# Patient Record
Sex: FEMALE | Race: WHITE | Employment: PART TIME | URBAN - METROPOLITAN AREA
[De-identification: names, ages, dates, MRNs, and addresses within clinical notes are randomized per-mention and may not be internally consistent; named-entity substitution may affect disease eponyms.]

---

## 2018-02-09 ENCOUNTER — HOSPITAL ENCOUNTER (OUTPATIENT)
Dept: ULTRASOUND IMAGING | Age: 28
Discharge: HOME | End: 2018-02-11
Payer: MEDICAID

## 2018-02-09 DIAGNOSIS — Z34.81 ENCOUNTER FOR SUPERVISION OF OTHER NORMAL PREGNANCY IN FIRST TRIMESTER: ICD-10-CM

## 2018-02-09 PROCEDURE — 76801 OB US < 14 WKS SINGLE FETUS: CPT

## 2023-12-18 ENCOUNTER — ANCILLARY PROCEDURE (OUTPATIENT)
Dept: RADIOLOGY | Facility: CLINIC | Age: 33
End: 2023-12-18
Payer: COMMERCIAL

## 2023-12-18 ENCOUNTER — OFFICE VISIT (OUTPATIENT)
Dept: ORTHOPEDIC SURGERY | Facility: CLINIC | Age: 33
End: 2023-12-18
Payer: COMMERCIAL

## 2023-12-18 DIAGNOSIS — M79.644 PAIN OF FINGER OF RIGHT HAND: ICD-10-CM

## 2023-12-18 PROCEDURE — 99214 OFFICE O/P EST MOD 30 MIN: CPT | Performed by: STUDENT IN AN ORGANIZED HEALTH CARE EDUCATION/TRAINING PROGRAM

## 2023-12-18 PROCEDURE — 73140 X-RAY EXAM OF FINGER(S): CPT | Mod: RT,FY

## 2023-12-18 PROCEDURE — 2500000004 HC RX 250 GENERAL PHARMACY W/ HCPCS (ALT 636 FOR OP/ED): Performed by: STUDENT IN AN ORGANIZED HEALTH CARE EDUCATION/TRAINING PROGRAM

## 2023-12-18 PROCEDURE — 73140 X-RAY EXAM OF FINGER(S): CPT | Mod: RIGHT SIDE | Performed by: STUDENT IN AN ORGANIZED HEALTH CARE EDUCATION/TRAINING PROGRAM

## 2023-12-18 PROCEDURE — 20550 NJX 1 TENDON SHEATH/LIGAMENT: CPT | Mod: RT | Performed by: STUDENT IN AN ORGANIZED HEALTH CARE EDUCATION/TRAINING PROGRAM

## 2023-12-18 PROCEDURE — 99204 OFFICE O/P NEW MOD 45 MIN: CPT | Performed by: STUDENT IN AN ORGANIZED HEALTH CARE EDUCATION/TRAINING PROGRAM

## 2023-12-18 PROCEDURE — 2500000005 HC RX 250 GENERAL PHARMACY W/O HCPCS: Performed by: STUDENT IN AN ORGANIZED HEALTH CARE EDUCATION/TRAINING PROGRAM

## 2023-12-18 RX ORDER — MELOXICAM 15 MG/1
15 TABLET ORAL DAILY
Qty: 30 TABLET | Refills: 0 | Status: SHIPPED | OUTPATIENT
Start: 2023-12-18 | End: 2024-01-17

## 2023-12-18 RX ORDER — LIDOCAINE HYDROCHLORIDE 10 MG/ML
0.5 INJECTION INFILTRATION; PERINEURAL
Status: COMPLETED | OUTPATIENT
Start: 2023-12-18 | End: 2023-12-18

## 2023-12-18 RX ADMIN — TRIAMCINOLONE ACETONIDE 5 MG: 10 INJECTION, SUSPENSION INTRA-ARTICULAR; INTRALESIONAL at 09:36

## 2023-12-18 RX ADMIN — LIDOCAINE HYDROCHLORIDE 0.5 ML: 10 INJECTION, SOLUTION INFILTRATION; PERINEURAL at 09:36

## 2023-12-18 NOTE — PROGRESS NOTES
History of Present Illness:  Presents for evaluation of right index finger pain.  The symptoms have been present for months. The patient denies any inciting trauma. The pain is localized to the A1 pulley of the affected finger. It is described as moderate. The pain occurs intermittantly and is more severe overnight and in the morning.  She works with her hands and bioengineering.      Review of Systems   GENERAL: Negative for malaise, significant weight loss, fever  MUSCULOSKELETAL: see HPI  NEURO:  Negative    The patient's past medical history, family history, social history, and review of systems were reviewed. History is otherwise negative except as stated in the HPI.    Physical Examination:  General: Alert and oriented to person, place, and time.  No acute distress and breathing comfortably: Pleasant and cooperative with examination.  HEENT: Head is normocephalic and atraumatic.  Neck: Supple, no visible swelling.  Cardiovascular: No palpable tachycardia  Lungs: No audible wheezing or labored breathing  Abdomen: Nondistended.  On musculoskeletal examination, the elbow and wrist have full, symmetric range of motion without obvious tenderness to palpation. Strength is full. Sensation and motor function are intact in the radial, ulnar, and median nerve distribution. There is no thenar or intrinsic atrophy with appropriate strength. There is tenderness over the corresponding A1 pulley of the right index finger. The adjacent fingers are unaffected. There is intact flexor and extensor tendon function. The patient can make a full composite fist but has pain while doing so. The hand itself is warm and well perfused. The skin is intact throughout. The contralateral hand and wrist are normal to inspection, range of motion, stability, and strength.    Imaging:  Right index finger without acute osseous process    Hand / UE Inj/Asp: R index A1 for trigger finger on 12/18/2023 9:36 AM  Indications: pain  Details: 24 G  needle, volar approach  Medications: 5 mg triamcinolone acetonide 10 mg/mL; 0.5 mL lidocaine 10 mg/mL (1 %)  Procedure, treatment alternatives, risks and benefits explained, specific risks discussed. Consent was given by the patient. Immediately prior to procedure a time out was called to verify the correct patient, procedure, equipment, support staff and site/side marked as required.             Assessment:  Patient with a right index finger pain and tenderness over A1 pulley.    Plan:  Injection. I had a long discussion with the patient regarding the diagnosis of trigger finger and the risks/benefits/expected outcomes of various treatment options.  At this point, the patient elected non-operative treatment consisting of a corticosteroid injection. I reviewed the specific risks of injection, which include, but are not limited to, infection, bleeding, pain, steroid flare, glycemic alteration, subcutaneous fat atrophy, skin hypopigmentation, soft tissue damage, and incomplete symptom relief. The patient consented to the injection. Then, using sterile technique, I injected 1mL of Kenalog 40 into the area of the flexor sheath at the level of the A1 pulley. The injection site was dressed, and the patient tolerated the injection well. Finally, I emphasized patience, as any benefit may take some time to manifest. Depending on the success of the injection, I will see them back on an as needed basis.    Mobic prescription also provided.    Follow up: 2 months.      Keara Castro MD  Orthopaedic Surgeon

## 2024-02-22 ENCOUNTER — OFFICE VISIT (OUTPATIENT)
Dept: PRIMARY CARE | Facility: CLINIC | Age: 34
End: 2024-02-22
Payer: COMMERCIAL

## 2024-02-22 ENCOUNTER — LAB (OUTPATIENT)
Dept: LAB | Facility: LAB | Age: 34
End: 2024-02-22
Payer: COMMERCIAL

## 2024-02-22 VITALS
SYSTOLIC BLOOD PRESSURE: 119 MMHG | WEIGHT: 185.4 LBS | HEART RATE: 72 BPM | DIASTOLIC BLOOD PRESSURE: 84 MMHG | BODY MASS INDEX: 28.1 KG/M2 | HEIGHT: 68 IN

## 2024-02-22 DIAGNOSIS — R19.7 DIARRHEA, UNSPECIFIED TYPE: ICD-10-CM

## 2024-02-22 DIAGNOSIS — R53.83 TIREDNESS: ICD-10-CM

## 2024-02-22 DIAGNOSIS — F12.90 MARIJUANA USE: ICD-10-CM

## 2024-02-22 DIAGNOSIS — Z87.19 HISTORY OF COLITIS: ICD-10-CM

## 2024-02-22 DIAGNOSIS — Z86.018: ICD-10-CM

## 2024-02-22 DIAGNOSIS — R63.0 LOSS OF APPETITE: ICD-10-CM

## 2024-02-22 DIAGNOSIS — Z86.2 HISTORY OF ANEMIA: ICD-10-CM

## 2024-02-22 DIAGNOSIS — R10.9 RIGHT SIDED ABDOMINAL PAIN: ICD-10-CM

## 2024-02-22 DIAGNOSIS — R10.9 RIGHT SIDED ABDOMINAL PAIN: Primary | ICD-10-CM

## 2024-02-22 PROBLEM — E78.00 HYPERCHOLESTEROLEMIA: Status: ACTIVE | Noted: 2024-02-22

## 2024-02-22 PROBLEM — L90.5 PAIN IN SURGICAL SCAR: Status: ACTIVE | Noted: 2024-02-22

## 2024-02-22 PROBLEM — R52 PAIN IN SURGICAL SCAR: Status: ACTIVE | Noted: 2024-02-22

## 2024-02-22 PROBLEM — R10.13 DYSPEPSIA: Status: ACTIVE | Noted: 2024-02-22

## 2024-02-22 LAB
ALBUMIN SERPL BCP-MCNC: 4.3 G/DL (ref 3.4–5)
ALP SERPL-CCNC: 29 U/L (ref 33–110)
ALT SERPL W P-5'-P-CCNC: 8 U/L (ref 7–45)
ANION GAP SERPL CALC-SCNC: 12 MMOL/L (ref 10–20)
AST SERPL W P-5'-P-CCNC: 11 U/L (ref 9–39)
BASOPHILS # BLD AUTO: 0.03 X10*3/UL (ref 0–0.1)
BASOPHILS NFR BLD AUTO: 0.5 %
BILIRUB SERPL-MCNC: 0.3 MG/DL (ref 0–1.2)
BUN SERPL-MCNC: 12 MG/DL (ref 6–23)
CALCIUM SERPL-MCNC: 9.3 MG/DL (ref 8.6–10.3)
CHLORIDE SERPL-SCNC: 106 MMOL/L (ref 98–107)
CO2 SERPL-SCNC: 24 MMOL/L (ref 21–32)
CREAT SERPL-MCNC: 0.74 MG/DL (ref 0.5–1.05)
CRP SERPL-MCNC: <0.1 MG/DL
EGFRCR SERPLBLD CKD-EPI 2021: >90 ML/MIN/1.73M*2
EOSINOPHIL # BLD AUTO: 0.09 X10*3/UL (ref 0–0.7)
EOSINOPHIL NFR BLD AUTO: 1.5 %
ERYTHROCYTE [DISTWIDTH] IN BLOOD BY AUTOMATED COUNT: 12.4 % (ref 11.5–14.5)
ERYTHROCYTE [SEDIMENTATION RATE] IN BLOOD BY WESTERGREN METHOD: 6 MM/H (ref 0–20)
GLUCOSE SERPL-MCNC: 75 MG/DL (ref 74–99)
HCT VFR BLD AUTO: 39.7 % (ref 36–46)
HGB BLD-MCNC: 12.5 G/DL (ref 12–16)
IMM GRANULOCYTES # BLD AUTO: 0.01 X10*3/UL (ref 0–0.7)
IMM GRANULOCYTES NFR BLD AUTO: 0.2 % (ref 0–0.9)
LYMPHOCYTES # BLD AUTO: 1.88 X10*3/UL (ref 1.2–4.8)
LYMPHOCYTES NFR BLD AUTO: 32.1 %
MCH RBC QN AUTO: 27.6 PG (ref 26–34)
MCHC RBC AUTO-ENTMCNC: 31.5 G/DL (ref 32–36)
MCV RBC AUTO: 88 FL (ref 80–100)
MONOCYTES # BLD AUTO: 0.49 X10*3/UL (ref 0.1–1)
MONOCYTES NFR BLD AUTO: 8.4 %
NEUTROPHILS # BLD AUTO: 3.36 X10*3/UL (ref 1.2–7.7)
NEUTROPHILS NFR BLD AUTO: 57.3 %
NRBC BLD-RTO: 0 /100 WBCS (ref 0–0)
PLATELET # BLD AUTO: 250 X10*3/UL (ref 150–450)
POTASSIUM SERPL-SCNC: 3.9 MMOL/L (ref 3.5–5.3)
PROT SERPL-MCNC: 6.8 G/DL (ref 6.4–8.2)
RBC # BLD AUTO: 4.53 X10*6/UL (ref 4–5.2)
SODIUM SERPL-SCNC: 138 MMOL/L (ref 136–145)
TSH SERPL-ACNC: 0.62 MIU/L (ref 0.44–3.98)
WBC # BLD AUTO: 5.9 X10*3/UL (ref 4.4–11.3)

## 2024-02-22 PROCEDURE — 84443 ASSAY THYROID STIM HORMONE: CPT

## 2024-02-22 PROCEDURE — 80053 COMPREHEN METABOLIC PANEL: CPT

## 2024-02-22 PROCEDURE — 1036F TOBACCO NON-USER: CPT | Performed by: INTERNAL MEDICINE

## 2024-02-22 PROCEDURE — 85652 RBC SED RATE AUTOMATED: CPT

## 2024-02-22 PROCEDURE — 99204 OFFICE O/P NEW MOD 45 MIN: CPT | Performed by: INTERNAL MEDICINE

## 2024-02-22 PROCEDURE — 86003 ALLG SPEC IGE CRUDE XTRC EA: CPT

## 2024-02-22 PROCEDURE — 86140 C-REACTIVE PROTEIN: CPT

## 2024-02-22 PROCEDURE — 85025 COMPLETE CBC W/AUTO DIFF WBC: CPT

## 2024-02-22 RX ORDER — LEVONORGESTREL 52 MG/1
1 INTRAUTERINE DEVICE INTRAUTERINE ONCE
COMMUNITY

## 2024-02-22 ASSESSMENT — LIFESTYLE VARIABLES
HAVE YOU OR SOMEONE ELSE BEEN INJURED AS A RESULT OF YOUR DRINKING: NO
HAS A RELATIVE, FRIEND, DOCTOR, OR ANOTHER HEALTH PROFESSIONAL EXPRESSED CONCERN ABOUT YOUR DRINKING OR SUGGESTED YOU CUT DOWN: NO
AUDIT TOTAL SCORE: 0
SKIP TO QUESTIONS 9-10: 1
HOW OFTEN DO YOU HAVE A DRINK CONTAINING ALCOHOL: NEVER
HOW MANY STANDARD DRINKS CONTAINING ALCOHOL DO YOU HAVE ON A TYPICAL DAY: PATIENT DOES NOT DRINK
AUDIT-C TOTAL SCORE: 0
HOW OFTEN DO YOU HAVE SIX OR MORE DRINKS ON ONE OCCASION: NEVER

## 2024-02-22 ASSESSMENT — PATIENT HEALTH QUESTIONNAIRE - PHQ9
SUM OF ALL RESPONSES TO PHQ9 QUESTIONS 1 AND 2: 0
1. LITTLE INTEREST OR PLEASURE IN DOING THINGS: NOT AT ALL
2. FEELING DOWN, DEPRESSED OR HOPELESS: NOT AT ALL

## 2024-02-22 NOTE — PROGRESS NOTES
Assessment/Plan   This is 33 years old female who has multiple symptoms which includes right-sided abdominal discomfort from the rib area to the lower abdomen but mostly in the right side.  She also has lots of nonspecific symptoms including tiredness decreased appetite, diarrhea.  She has had history of anemia.  Patient wants CT of the abdominal MRI of the abdomen done.  I have reviewed the available records in detail.  After lengthy discussion it was decided that at this time we will do a screening testing including food allergy panel.  I also advised her to start making a diary regarding her symptoms and related food items.  Depending on the results we will further review and decide about imaging studies which are appropriate.    I also advised her to see the GYN to discuss further about the IUD and may be the pelvic symptoms.    Patient states she has had the blood test done through the workplace about 6 months ago and those are not available and advised her to bring it to us.    Patient will be followed up in 2 weeks time.      Problem List Items Addressed This Visit       History of colitis - Primary    Relevant Orders    CBC and Auto Differential    Comprehensive Metabolic Panel    Food Allergy Profile IgE    Right sided abdominal pain    Relevant Orders    CBC and Auto Differential    Comprehensive Metabolic Panel    TSH with reflex to Free T4 if abnormal    History of benign neoplasm of ovary    History of anemia    Relevant Orders    CBC and Auto Differential    Food Allergy Profile IgE    Marijuana use    Loss of appetite    Relevant Orders    Food Allergy Profile IgE     Other Visit Diagnoses       Tiredness        Relevant Orders    CBC and Auto Differential    TSH with reflex to Free T4 if abnormal    C-Reactive Protein    Sedimentation Rate    Food Allergy Profile IgE    Diarrhea, unspecified type        Relevant Orders    Food Allergy Profile IgE            Subjective     Patient ID: Crystal JED Newsome  is a 33 y.o. female who presents for Establish Care, digestive issues (X 2years), and pain right rib area x 1.5 years.    History of present illness  33 years old female who has not seen a primary care physician for several years came for a established visit and like to discuss several complaints.  Patient's main concern is that she is having right-sided abdominal discomfort and diarrhea with generalized tiredness.  Patient had seen a gastroenterologist for the last 8 months, and her last visit was about 3 months ago.  Patient had a colonoscopy done and the biopsy showed nonspecific colitis and she was put on nonsteroidal anti-inflammatory medications and she had a repeat colonoscopy which showed her colitis has resolved.  So patient was told by the gastroenterology she has IBS.  Patient is not happy with it.  Patient also has had ultrasound of the gallbladder and also HIDA scan done which patient says for her did not give the appropriate answer.  She continues to have this abdominal discomfort sometimes with the food.  She has no intolerance to any gluten diet but she says she cannot eat starch or sugar which causes her abdominal pain.  She has been eliminating several food including milk and similar diary food because she thinks that is causing her also symptoms.  She has intermittent diarrhea which is 2 of her day-to-day life.  She is also generally tired.    She says she smokes on and off and that she uses marijuana 2-3 times a week.    She has had history of right ovarian surgery and it was a scar tissue removed according to patient.  She is able to have children after that.  She has 3 children.    Patient has IUD with progesterone.  She says the symptoms worsen after the IUD but then she says she does not want IUD to be removed because she feels like the symptoms are not from the IUD her appetite is poor but she has trouble losing weight    She is physically active and works full-time she does some  yoga.    She occasionally had some palpitation but had no chest pain.  She denies any significant urine symptoms.    She has family history of colitis and Crohn's disease with grandparents.    Review of systems she has no leg swellings.    Family History   Problem Relation Name Age of Onset    Diverticulitis Mother      Diabetes Father      Other (silent reflux) Sister      Ulcerative colitis Maternal Grandfather      Crohn's disease Maternal Grandfather      Heart disease Paternal Grandmother        Social History     Socioeconomic History    Marital status:      Spouse name: Not on file    Number of children: Not on file    Years of education: Not on file    Highest education level: Not on file   Occupational History    Not on file   Tobacco Use    Smoking status: Former     Types: Cigarettes    Smokeless tobacco: Never   Vaping Use    Vaping Use: Never used   Substance and Sexual Activity    Alcohol use: Never    Drug use: Yes     Types: Marijuana    Sexual activity: Not on file   Other Topics Concern    Not on file   Social History Narrative    Not on file     Social Determinants of Health     Financial Resource Strain: Not on file   Food Insecurity: Not on file   Transportation Needs: Not on file   Physical Activity: Not on file   Stress: Not on file   Social Connections: Not on file   Intimate Partner Violence: Not on file   Housing Stability: Not on file      Patient has no known allergies.   Current Outpatient Medications   Medication Sig Dispense Refill    levonorgestrel (Mirena) 21 mcg/24 hours (8 yrs) 52 mg IUD 52 mg by intrauterine route 1 time.       No current facility-administered medications for this visit.      Objective     Vitals:    02/22/24 0836   BP: 119/84   Pulse: 72        Physical Exam   Normal-built, well-nourished  with no apparent distress. Alert oriented  Skin:  Normal turgor.  No rash.  Head:  Normocephalic, atraumatic.  Eyes:  Pupils are equal, round,.  No pallor of  conjunctivae.  Mouth has moist oral mucosa.  Neck:  Supple.  No JVD.  No carotid bruit.  No thyromegaly. No cervical lymphadenopathy.  No clubbing, no evidence of peripheral osteoarthritis  Chest:  Vesicular breathing Bilaterally good air entry and bilaterally clear to auscultation.  No wheezing.  No crackles.  Heart:  Regular rate and rhythm.  S1, S2 positive.  No murmur.  Abdomen:  Soft and nontender.  Abdominal striae noted.  Complain of discomfort which is superficial on the palpation on the right side mostly in the flanks.  No evidence of ascites or peritonitis.  Bowel sounds are positive.  No organomegaly.  Extremities:  Bilaterally no pedal pitting edema.  Bilaterally 2+ dorsalis pedis pulses.  No calf tenderness. Homans sign is negative.  Neuro Exam: No focal signs. Gait is normal.      Problem List Items Addressed This Visit       History of colitis - Primary    Relevant Orders    CBC and Auto Differential    Comprehensive Metabolic Panel    Food Allergy Profile IgE    Right sided abdominal pain    Relevant Orders    CBC and Auto Differential    Comprehensive Metabolic Panel    TSH with reflex to Free T4 if abnormal    History of benign neoplasm of ovary    History of anemia    Relevant Orders    CBC and Auto Differential    Food Allergy Profile IgE    Marijuana use    Loss of appetite    Relevant Orders    Food Allergy Profile IgE     Other Visit Diagnoses       Tiredness        Relevant Orders    CBC and Auto Differential    TSH with reflex to Free T4 if abnormal    C-Reactive Protein    Sedimentation Rate    Food Allergy Profile IgE    Diarrhea, unspecified type        Relevant Orders    Food Allergy Profile IgE             Orders Placed This Encounter   Procedures    CBC and Auto Differential     Standing Status:   Future     Standing Expiration Date:   2/22/2025     Order Specific Question:   Release result to [employer]     Answer:   Immediate    Comprehensive Metabolic Panel     Standing Status:    "Future     Standing Expiration Date:   2/22/2025     Order Specific Question:   Release result to MyChart     Answer:   Immediate    TSH with reflex to Free T4 if abnormal     Standing Status:   Future     Standing Expiration Date:   2/22/2025     Order Specific Question:   Release result to MyChart     Answer:   Immediate    C-Reactive Protein     Standing Status:   Future     Standing Expiration Date:   2/22/2025     Order Specific Question:   Release result to MyChart     Answer:   Immediate [1]    Sedimentation Rate     Standing Status:   Future     Standing Expiration Date:   2/22/2025     Order Specific Question:   Release result to MyChart     Answer:   Immediate [1]    Food Allergy Profile IgE     Standing Status:   Future     Standing Expiration Date:   2/22/2025     Order Specific Question:   Release result to MyChart     Answer:   Immediate [1]        Lab Results   Component Value Date    WBC 8.2 08/18/2021    HGB 10.4 (L) 08/18/2021    HCT 33.2 (L) 08/18/2021     08/18/2021     No results found for: \"CHOL\", \"LDLCALC\", \"HDLC\", \"LCTRG\", \"CHHDL\"    No results found.                "

## 2024-02-22 NOTE — RESULT ENCOUNTER NOTE
Available results are acceptable.  Please note that the food allergy panel is pending.  We will review the results in detail during office follow-up and please advise patient to keep the follow-up appointment as scheduled.  If patient has no appointment pending please advise patient to make a follow-up appointment as we discussed previously.

## 2024-02-23 ENCOUNTER — TELEPHONE (OUTPATIENT)
Dept: PRIMARY CARE | Facility: CLINIC | Age: 34
End: 2024-02-23
Payer: COMMERCIAL

## 2024-02-23 LAB
CLAM IGE QN: <0.1 KU/L
CODFISH IGE QN: <0.1 KU/L
CORN IGE QN: <0.1
EGG WHITE IGE QN: <0.1 KU/L
MILK IGE QN: 0.13 KU/L
PEANUT IGE QN: <0.1 KU/L
SCALLOP IGE QN: <0.1 KU/L
SESAME SEED IGE QN: <0.1 KU/L
SHRIMP IGE QN: <0.1 KU/L
SOYBEAN IGE QN: <0.1 KU/L
WALNUT IGE QN: <0.1 KU/L
WHEAT IGE QN: <0.1 KU/L

## 2024-02-23 NOTE — TELEPHONE ENCOUNTER
----- Message from Keith Lai MD sent at 2/22/2024  3:51 PM EST -----  Available results are acceptable.  Please note that the food allergy panel is pending.  We will review the results in detail during office follow-up and please advise patient to keep the follow-up appointment as scheduled.  If patient has no appointment pending please advise patient to make a follow-up appointment as we discussed previously.

## 2024-03-06 ENCOUNTER — APPOINTMENT (OUTPATIENT)
Dept: PRIMARY CARE | Facility: CLINIC | Age: 34
End: 2024-03-06
Payer: COMMERCIAL

## 2024-03-07 ENCOUNTER — OFFICE VISIT (OUTPATIENT)
Dept: PRIMARY CARE | Facility: CLINIC | Age: 34
End: 2024-03-07
Payer: COMMERCIAL

## 2024-03-07 VITALS
DIASTOLIC BLOOD PRESSURE: 81 MMHG | SYSTOLIC BLOOD PRESSURE: 114 MMHG | HEIGHT: 68 IN | BODY MASS INDEX: 27.55 KG/M2 | HEART RATE: 66 BPM | WEIGHT: 181.8 LBS

## 2024-03-07 DIAGNOSIS — F12.90 MARIJUANA USE: ICD-10-CM

## 2024-03-07 DIAGNOSIS — E78.00 HYPERCHOLESTEROLEMIA: ICD-10-CM

## 2024-03-07 DIAGNOSIS — R10.9 RIGHT SIDED ABDOMINAL PAIN: Primary | ICD-10-CM

## 2024-03-07 DIAGNOSIS — Z91.011 MILK ALLERGY: ICD-10-CM

## 2024-03-07 PROCEDURE — 1036F TOBACCO NON-USER: CPT | Performed by: INTERNAL MEDICINE

## 2024-03-07 PROCEDURE — 99214 OFFICE O/P EST MOD 30 MIN: CPT | Performed by: INTERNAL MEDICINE

## 2024-03-07 ASSESSMENT — LIFESTYLE VARIABLES
HOW MANY STANDARD DRINKS CONTAINING ALCOHOL DO YOU HAVE ON A TYPICAL DAY: PATIENT DOES NOT DRINK
AUDIT TOTAL SCORE: 0
SKIP TO QUESTIONS 9-10: 1
HOW OFTEN DO YOU HAVE SIX OR MORE DRINKS ON ONE OCCASION: NEVER
HAS A RELATIVE, FRIEND, DOCTOR, OR ANOTHER HEALTH PROFESSIONAL EXPRESSED CONCERN ABOUT YOUR DRINKING OR SUGGESTED YOU CUT DOWN: NO
AUDIT-C TOTAL SCORE: 0
HOW OFTEN DO YOU HAVE A DRINK CONTAINING ALCOHOL: NEVER
HAVE YOU OR SOMEONE ELSE BEEN INJURED AS A RESULT OF YOUR DRINKING: NO

## 2024-03-07 ASSESSMENT — PATIENT HEALTH QUESTIONNAIRE - PHQ9
SUM OF ALL RESPONSES TO PHQ9 QUESTIONS 1 AND 2: 0
2. FEELING DOWN, DEPRESSED OR HOPELESS: NOT AT ALL
1. LITTLE INTEREST OR PLEASURE IN DOING THINGS: NOT AT ALL

## 2024-03-07 NOTE — PROGRESS NOTES
Assessment/Plan   33 years old female who has intermittent right-sided abdominal pain with other nonspecific symptoms which are stable or improved since she is wearing the diary products.  Patient's lab test shows with the food allergy patient has elevated IgE for cow milk.  She understands to avoid the cow milk and the products.  But because of the long-term consequences in the management it was decided that patient will see an allergy immunologist.  Irritable bowel syndrome was discussed in detail.    Patient uses marijuana and I discussed the side effects and the detrimental effect of marijuana when she understands to cut down or stop it.    She has had history of slightly elevated cholesterol which she will try to control it by diet and exercise.    Patient will be followed up in 3 months time except if there is any new change or concern she will be seen sooner.      Problem List Items Addressed This Visit       Right sided abdominal pain - Primary    Marijuana use    Hypercholesterolemia    Milk allergy    Relevant Orders    Referral to Allergy       Subjective     Patient ID: Crystal Newsome is a 33 y.o. female who presents for Results.    History of present illness  Patient came for a follow-up visit since she had established visit about 2 weeks ago.  She has had the blood test done.  She continues to have some intermittent abdominal discomfort but it is better controlled since she is avoiding milk.  As mentioned in my previous visit and review of the records patient has had extensive workup done which does not show any pathology for her right upper quadrant discomfort.  But patient says that when she take any dairy product she gets some discomfort but now since she is avoiding them she is generally feeling stable.  She also concerned that her IUD may be causing some side effects because as per her history her abdominal discomfort coincides with the IUD insertion recently.    She denies any chest pain or short  of breath.  She is active and has 3 children.    She denies any depression or suicidal homicidal ideation.    Rest of the review of systems no acute complaints.    Family History   Problem Relation Name Age of Onset    Diverticulitis Mother      Diabetes Father      Hashimoto's thyroiditis Father      Other (silent reflux) Sister      Ulcerative colitis Maternal Grandfather      Crohn's disease Maternal Grandfather      Heart disease Paternal Grandmother        Social History     Socioeconomic History    Marital status:      Spouse name: Not on file    Number of children: Not on file    Years of education: Not on file    Highest education level: Not on file   Occupational History    Not on file   Tobacco Use    Smoking status: Never     Passive exposure: Never    Smokeless tobacco: Never   Vaping Use    Vaping Use: Never used   Substance and Sexual Activity    Alcohol use: Never    Drug use: Never    Sexual activity: Not on file   Other Topics Concern    Not on file   Social History Narrative    Not on file     Social Determinants of Health     Financial Resource Strain: Not on file   Food Insecurity: Not on file   Transportation Needs: Not on file   Physical Activity: Not on file   Stress: Not on file   Social Connections: Not on file   Intimate Partner Violence: Not on file   Housing Stability: Not on file      Patient has no known allergies.   Current Outpatient Medications   Medication Sig Dispense Refill    levonorgestrel (Mirena) 21 mcg/24 hours (8 yrs) 52 mg IUD 52 mg by intrauterine route 1 time.       No current facility-administered medications for this visit.        Objective     Vitals:    03/07/24 0825   BP: 114/81   Pulse: 66        Physical Exam   Normal-built, well-nourished  with no apparent distress. Alert oriented  Skin:  Normal turgor.  No rash.  Head:  Normocephalic, atraumatic.  Eyes:  Pupils are equal, round,.  No pallor of conjunctivae.  Mouth has moist oral mucosa.   Neck:  Supple.   No JVD.  No carotid bruit.  No thyromegaly. No cervical lymphadenopathy.  No clubbing  Chest:  Vesicular breathing Bilaterally good air entry and bilaterally clear to auscultation.  No wheezing.  No crackles.  Heart:  Regular rate and rhythm.  S1, S2 positive.  No murmur.  Abdomen:  Soft and nontender.  Roe sign is negative.  Bowel sounds are positive.  No organomegaly.  Extremities:  Bilaterally no pedal pitting edema.  Bilaterally 2+ dorsalis pedis pulses.  No calf tenderness. Homans sign is negative.  Neuro Exam: No focal signs. Gait is normal.      Problem List Items Addressed This Visit       Right sided abdominal pain - Primary    Marijuana use    Hypercholesterolemia    Milk allergy    Relevant Orders    Referral to Allergy        Orders Placed This Encounter   Procedures    Referral to Allergy     Standing Status:   Future     Standing Expiration Date:   9/7/2024     Referral Priority:   Routine     Referral Type:   Consultation     Referral Reason:   Specialty Services Required     Requested Specialty:   Allergy and Immunology     Number of Visits Requested:   1        Lab Results   Component Value Date    WBC 5.9 02/22/2024    HGB 12.5 02/22/2024    HCT 39.7 02/22/2024     02/22/2024    ALT 8 02/22/2024    AST 11 02/22/2024     02/22/2024    K 3.9 02/22/2024     02/22/2024    CREATININE 0.74 02/22/2024    BUN 12 02/22/2024    CO2 24 02/22/2024    TSH 0.62 02/22/2024      Latest Reference Range & Units 02/22/24 09:17   GLUCOSE 74 - 99 mg/dL 75   SODIUM 136 - 145 mmol/L 138   POTASSIUM 3.5 - 5.3 mmol/L 3.9   CHLORIDE 98 - 107 mmol/L 106   Bicarbonate 21 - 32 mmol/L 24   Anion Gap 10 - 20 mmol/L 12   Blood Urea Nitrogen 6 - 23 mg/dL 12   Creatinine 0.50 - 1.05 mg/dL 0.74   EGFR >60 mL/min/1.73m*2 >90   Calcium 8.6 - 10.3 mg/dL 9.3   Albumin 3.4 - 5.0 g/dL 4.3   Alkaline Phosphatase 33 - 110 U/L 29 (L)   ALT 7 - 45 U/L 8   AST 9 - 39 U/L 11   Bilirubin Total 0.0 - 1.2 mg/dL 0.3   Total  Protein 6.4 - 8.2 g/dL 6.8   C-Reactive Protein <1.00 mg/dL <0.10   Thyroid Stimulating Hormone 0.44 - 3.98 mIU/L 0.62   WBC 4.4 - 11.3 x10*3/uL 5.9   nRBC 0.0 - 0.0 /100 WBCs 0.0   RBC 4.00 - 5.20 x10*6/uL 4.53   HEMOGLOBIN 12.0 - 16.0 g/dL 12.5   HEMATOCRIT 36.0 - 46.0 % 39.7   MCV 80 - 100 fL 88   MCH 26.0 - 34.0 pg 27.6   MCHC 32.0 - 36.0 g/dL 31.5 (L)   RED CELL DISTRIBUTION WIDTH 11.5 - 14.5 % 12.4   Platelets 150 - 450 x10*3/uL 250   Neutrophils % 40.0 - 80.0 % 57.3   Immature Granulocytes %, Automated 0.0 - 0.9 % 0.2   Lymphocytes % 13.0 - 44.0 % 32.1   Monocytes % 2.0 - 10.0 % 8.4   Eosinophils % 0.0 - 6.0 % 1.5   Basophils % 0.0 - 2.0 % 0.5   Neutrophils Absolute 1.20 - 7.70 x10*3/uL 3.36   Immature Granulocytes Absolute, Automated 0.00 - 0.70 x10*3/uL 0.01   Lymphocytes Absolute 1.20 - 4.80 x10*3/uL 1.88   Monocytes Absolute 0.10 - 1.00 x10*3/uL 0.49   Eosinophils Absolute 0.00 - 0.70 x10*3/uL 0.09   Basophils Absolute 0.00 - 0.10 x10*3/uL 0.03   Sed Rate 0 - 20 mm/h 6   Cow's Milk IgE <0.10 kU/L 0.13 !   Clam IgE <0.10 kU/L <0.10   Egg White IgE <0.10 kU/L <0.10   Fish (Cod) IgE <0.10 kU/L <0.10   [place], Corn IgE  <0.10   Peanut IgE <0.10 kU/L <0.10   Scallop IgE <0.10 kU/L <0.10   Sesame Seed IgE <0.10 kU/L <0.10   Shrimp IgE <0.10 kU/L <0.10   Soybean IgE <0.10 kU/L <0.10   [place] IgE <0.10 kU/L <0.10   Wheat IgE <0.10 kU/L <0.10   (L): Data is abnormally low  !: Data is abnormal

## 2024-03-16 NOTE — PROGRESS NOTES
History of Present Illness  Patient returns today for evaluation of right index TF .  Complete resolution with injection.    Today complains of left SI and greater troch hip pain.  Has seen a chiropractor without considerable improvement.  Has not tried anti-inflammatories or physical therapy.    Physical Examination:  Right upper extremity:  The patient appears to be their stated age, is in no apparent distress, and is oriented x3. The patients mood and affect are appropriate. The patients gait is normal. The examination of the limb in question was performed in comparison to the contralateral limb.    On musculoskeletal examination, no tenderness palpation over the right A1 pulley.  No clicking.    Sensation and motor function are intact in the radial, and median nerve distribution. There is no obvious thenar atrophy, and thenar strength is 5/5. There is no intrinsic atrophy, and intrinsic strength is 5/5.  The patient can make a full composite fist. The hand itself is warm and well perfused. The skin is intact throughout. The contralateral hand/wrist are normal to inspection, range of motion, stability, and strength.    Left lower extremity.  Tenderness palpation over left SI and greater troch.  No radiation of pain.  Negative straight leg raise.  Negative FADIR and JULIANNA.  Sensation tact light touch throughout the leg.  5 out of 5 strength throughout.        Assessment:  Patient with left hip sacral iliitis.  Additionally left hip pain.  Appears to be bursal in nature.  Recommend anti-inflammatories and physical therapy.  Left hip x-ray ordered.  Will have her follow-up with one of my nonoperative partners to discuss potential cortisone injection into SI or greater troch.     Plan:   PT. Mobic. Follow up with Dr. Budinsky to discuss injection    Keara Castro MD

## 2024-03-19 ENCOUNTER — HOSPITAL ENCOUNTER (OUTPATIENT)
Dept: RADIOLOGY | Facility: CLINIC | Age: 34
Discharge: HOME | End: 2024-03-19
Payer: COMMERCIAL

## 2024-03-19 ENCOUNTER — OFFICE VISIT (OUTPATIENT)
Dept: ORTHOPEDIC SURGERY | Facility: CLINIC | Age: 34
End: 2024-03-19
Payer: COMMERCIAL

## 2024-03-19 DIAGNOSIS — M25.552 LEFT HIP PAIN: ICD-10-CM

## 2024-03-19 PROCEDURE — 73502 X-RAY EXAM HIP UNI 2-3 VIEWS: CPT | Mod: LEFT SIDE | Performed by: RADIOLOGY

## 2024-03-19 PROCEDURE — 1036F TOBACCO NON-USER: CPT | Performed by: STUDENT IN AN ORGANIZED HEALTH CARE EDUCATION/TRAINING PROGRAM

## 2024-03-19 PROCEDURE — 99214 OFFICE O/P EST MOD 30 MIN: CPT | Performed by: STUDENT IN AN ORGANIZED HEALTH CARE EDUCATION/TRAINING PROGRAM

## 2024-03-19 PROCEDURE — 73502 X-RAY EXAM HIP UNI 2-3 VIEWS: CPT | Mod: LT

## 2024-03-19 RX ORDER — MELOXICAM 15 MG/1
15 TABLET ORAL DAILY
Qty: 30 TABLET | Refills: 0 | Status: SHIPPED | OUTPATIENT
Start: 2024-03-19 | End: 2024-04-18

## 2024-03-21 ENCOUNTER — EVALUATION (OUTPATIENT)
Dept: PHYSICAL THERAPY | Facility: CLINIC | Age: 34
End: 2024-03-21
Payer: COMMERCIAL

## 2024-03-21 DIAGNOSIS — M25.552 LEFT HIP PAIN: ICD-10-CM

## 2024-03-21 PROCEDURE — 97161 PT EVAL LOW COMPLEX 20 MIN: CPT | Mod: GP | Performed by: PHYSICAL THERAPIST

## 2024-03-21 PROCEDURE — 97140 MANUAL THERAPY 1/> REGIONS: CPT | Mod: GP | Performed by: PHYSICAL THERAPIST

## 2024-03-21 PROCEDURE — 97535 SELF CARE MNGMENT TRAINING: CPT | Mod: GP | Performed by: PHYSICAL THERAPIST

## 2024-03-21 PROCEDURE — 97014 ELECTRIC STIMULATION THERAPY: CPT | Mod: GP | Performed by: PHYSICAL THERAPIST

## 2024-03-21 ASSESSMENT — PAIN - FUNCTIONAL ASSESSMENT: PAIN_FUNCTIONAL_ASSESSMENT: 0-10

## 2024-03-21 ASSESSMENT — PAIN SCALES - GENERAL: PAINLEVEL_OUTOF10: 4

## 2024-03-21 ASSESSMENT — PAIN DESCRIPTION - DESCRIPTORS: DESCRIPTORS: ACHING

## 2024-03-21 NOTE — PROGRESS NOTES
PT Initial Evaluation    Patient Name:  Crystal Newsome    MRN:  [id number]    :  1990    Today's Date:  24    Time Calculation  Start Time: 0700  Stop Time: 0758  Time Calculation (min): 58 min  PT Evaluation Time Entry  PT Evaluation (Low) Time Entry: 15  PT Therapeutic Procedures Time Entry  Manual Therapy Time Entry: 15  Self-Care/Home Mgmt Trainin  PT Modalities Time Entry  E-Stim (Unattended) Time Entry: 15    Informed Consent  Patient has been informed of all evaluation findings and treatment plans and agrees to participate in Physical Therapy services and plans as outlined.    Diagnosis:  Diagnosis and Precautions: Diagnosis  M25.552 (ICD-10-CM) - Left hip pain    Goals:   By the end of 5 visits patient will be able to do the following with < 1/10 L HIP/L SIJ pain:    HEP:  Patient will consistently perform HER home exercise program for 20-30 minute sessions, 1-2x/day, 3-4 days/week independently by the end of 5 visits.    Basic ADL's:   Patient will perform bADL's/instrumental ADL's for 30 minutes moving between various closed kinetic chain postures.    ROM and Strength:  Patient will demonstrate 5/5 L HIP strength in all planes and WNL's LUMBAR SPINE and L HIP AROM in all planes to improve their ability to lift, stand, ambulate and perform basic ADL's.    Stair Negotiation:  Patient will be able to ambulate up/down stairs for 1-2 flights at a time.    Gait/Locomotion:  Patient will be able to ambulate for 30-60 minutes at a time.  Minimal to no gait deviation across level ground/stairs.  Patient will demonstrate no LUMBAR SPINE and L HIP, L SIJ pain with the following movements:  partial squat, lunge, forward/lateral step-up, HR, stepdown and SLS.    WORK:  Patient will return to WORK and perform normal WORK activities pain free.    Sleep:  Patient will sleep thru the night 4/7 nights/week.    Participation restrictions:  Increase LEFS to > or = to 72/80 for increased functional  ability.    Pain:  Decrease pain at worst to < or = to 1/10 for improved QOL and ability to sleep.    No point tenderness noted over the L SIJ/L posterior hip.     Plan of Care:      Treatment/Interventions: Cryotherapy, Education/ Instruction, Electrical stimulation, Manual therapy, Neuromuscular re-education, Self care/ home management, Taping techniques, Therapeutic activities, Therapeutic exercises, Ultrasound  PT Plan: Skilled PT  PT Frequency: 1 time per week  Duration: 5 visits  Onset Date: 06/21/23  Certification Period Start Date: 03/21/24  Certification Period End Date: 06/19/24  Number of Treatments Authorized: 5  Rehab Potential: Good  Plan of Care Agreement: Patient    PT Assessment:    Patient is a 33 y.o. FEMALE with c/o L SIJ/L posterior hip pain.   Patient is alert and oriented x 3.  Patient presents with medical diagnosis of L hip pain contributing to compensatory soft tissue dysfunction, pain, stiffness and weakness of the lumbo-pelvic-hip complex.   Significant past medical history/past surgical history includes none.    Skilled care is needed to progress the patient back to these activities without exacerbating symptoms.   Patient requires skilled PT services to address the problems identified and the individualized patient's goals as outlined in the problems and goals section of this evaluation.  A skilled PT is required to address these key impairments and to provide and progress with an appropriate home exercise program. Patient does not have any significant PMH influencing Rx and reports motivation to return to FUNCTIONAL ACTIVITY.   Patient demonstrates to be a good candidate for physical therapy with good rehab potential and verbalized a good understanding of HER diagnosis, prognosis and treatment.  Goals have been established and reviewed with the patient.      PT Assessment Results: Decreased strength, Decreased endurance, Decreased mobility, Pain  Rehab Prognosis:  Good  Evaluation/Treatment Tolerance: Patient tolerated treatment well    Complexity:  Low complexity evaluation  due to a 15 minute duration, a past medical history WITHOUT any personal factors and/or comorbidities that could impact the POC, examination of body systems completed on one to two elements, the patient presents with a stable condition, and clinical decision making using the LEFS was of low complexity.     Prognosis:  Rehab Prognosis: Good    Problem List  Activity Limitations, Decreased Functional Level, Decreased knowledge of HEP, Gait issues, Pain, Range of Motion/joint mobility issues, Strength, and Endurance    Impairments   INCREASED PAIN, Limited ROM, core weakness, decreased core stability, L SIJ pain, L glute pain    Functional Limitations:  LIMITATIONS PERFORMING BASIC ADL'S, ISSUES WITH SLEEP, WORK ISSUES, PARTICIPATION IN HOBBIES, PARTICIPATION IN LEISURE ACTIVITIES, and PARTICIPATION IN HOME MANAGEMENT    General Visit Information:  Reason for Referral: PT Evaluate and Treat  Referred By: Dr. Keara Patton  General Comment: Diagnosis  M25.552 (ICD-10-CM) - Left hip pain    Pre-Cautions:  MICHAELADI Fall Risk Score (The score of 4 or more indicates an increased risk of falling): 0     Medical Precautions:  (GI issues))     Reason for Visit:  PT Evaluate and Treat    Initial Evaluation:  Referred By: Dr. Keara Patton    Insurance  Insurance reviewed  Name of Insurance:  [facility]  Visit No.  1  * (EVAL) Left hip pain [M25.552] 1350/4050 DED 20% COINSUR $0 COPAY PA IS NOT REQ UNLIM V BMN PER [email or web address] [id number] // Alana confirmed 3/20/24 6:38pm     Subjective:    Current Episode  Date of Onset:  9 months ago  Mechanism of injury:  Patient reports working as a  and is on her feet all day at work on a hard surface.  Patient denies any MVA, falls, trauma or surgery.    Pain Score:  Pain Assessment: 0-10  Pain Assessment  Pain Assessment:  0-10  Pain Score: 4 (7/10- worst, 2/10 least)  Pain Type: Chronic pain  Pain Location: Back (L hip/L SI)  Pain Orientation: Left, Posterior, Lower  Pain Radiating Towards: L anterior hip, L buttock  Pain Descriptors: Aching  Pain Frequency: Constant/continuous  Pain Type: Chronic pain  Pain Location: Back (L hip/L SI)  Pain Orientation: Left, Posterior, Lower  Pain Descriptors: Aching  Pain Frequency: Constant/continuous    Better with:  massage gun, stretching, heat, ice    Worse with:  prolonged walking/standing, squatting, bending, extending her back    Medical History/Surgical History:  Medical Precautions:  (GI issues)    Reviewed medical history form with patient (medications/allergies reviewed with patient).  Current Outpatient Medications   Medication Instructions    levonorgestrel (Mirena) 21 mcg/24 hours (8 yrs) 52 mg IUD 1 each, intrauterine, Once    meloxicam (MOBIC) 15 mg, oral, Daily     Radiology:  Study Result    Narrative & Impression   Interpreted By:  Clovis Yeboah,   STUDY:  XR HIP LEFT WITH PELVIS WHEN PERFORMED 2 OR 3 VIEWS      INDICATION:  Signs/Symptoms:pain.      COMPARISON:  None      ACCESSION NUMBER(S):  [id number]      ORDERING CLINICIAN:  CASSANDRA OSBORNE      FINDINGS:  Cam morphology left femoral neck. Joint space normal. No evidence of  fracture.      IMPRESSION:  Cam left femoral neck. No acute findings.         Functional Assessment:  Level of [place]:  Level of [place]: Independent with ADLs and functional transfers    Work Status:  EMPLOYED,   Patient Awareness:  Patient is aware of HER diagnosis and prognosis.  Social Support/History:  LIVES WITH FAMILY    Objective:    Weightbearing Status:  FWB    Skin:  skin intact over lower back, L hip    Palpation:   point tenderness over L PSIS, L gluteus medius, L greater trochanter, L hip flexor    Sensation:  Patient denies numbness/tingling of bilateral LOWER extremities.    Gait:  minor antalgic  "gait L LE    Lower Extremity Movement Testing:  Squat-WNL's  Single leg stance  R- WNL's  L- L sided lower back pain    ROM:  AROM  Lumbar   Flexion end range pain  Extension limited and reproduces L lower back pain  Rotation 25% reduced R and L  SB limited to the L  R knee AROM WNL's, L knee AROM WNL's, R hip AROM WNL's, L hip AROM WNL's, R ankle/foot AROM WNL's, and L ankle/foot AROM WNL's    Strength:    L hip 4-/5 all planes with pain  R knee 5/5 all planes, L knee 5/5 all planes, R hip 5/5 all planes, R ankle/foot 5/5 all planes, and L ankle/foot 5/5 all planes    Special Tests  negative SLR on L, + Gaenslen's test    Outcome measure  Lower Extremity Funtional Score (LEFS): 62/80      Treatment  Time in clinic started at  7am  Time in clinic ended at  7:58am  Total time in clinic is . 58 minutes  Total timed code time is  53 minutes    Treatment Performed Today:.   PT Initial Evaluation, Electrical Stimulation, Manual Therapy, and Self-Care/Home Management HEP  Individual(s) Educated: Patient  Education Provided: Home Exercise Program  Diagnosis and Precautions: Diagnosis  M25.552 (ICD-10-CM) - Left hip pain  Risk and Benefits Discussed with Patient/Caregiver/Other: yes  Patient/Caregiver Demonstrated Understanding: yes  Plan of Care Discussed and Agreed Upon: yes  Patient Response to Education: Patient/Caregiver Verbalized Understanding of Information, Patient/Caregiver Performed Return Demonstration of Exercises/Activities    Manual therapy  Supine L hip flexor MET, R HS MET 10\"x10  Supine hip abduction MET x 5  R sidelying L lumbar gapping mobilization  Supine Modified L SIJ mobilization    Patient encouraged to ice 2-3x/day for the next 2-3 days upon completion of treatment secondary to possible muscle/joint soreness.     Patient instructed in a home exercise program, has been given handouts for each of the exercises performed and was given another sheet instructing patient in the amount of reps to perform " and the rogelio to follow while doing the exercises      Access Code: [email or web address]  URL: [email or web address]  Date: 03/21/2024  Prepared by: Pravin Schreiber    Exercises  - 90/90 SI Joint Self-Correction with Dowel  - 1 x daily - 3-4 x weekly - 1 sets - 10 reps - 10 hold  - Hooklying Clamshell with Resistance  - 1 x daily - 3-4 x weekly - 2 sets - 10 reps - 5-10 hold  - Sidelying Hip Abduction (advised patient to hold off with this one for now)  - 1 x daily - 3-4 x weekly - 2 sets - 10 reps - 5-10 hold  - Clamshell  - 1 x daily - 3-4 x weekly - 2 sets - 10 reps - 5-10 hold  - Supine Bridge  - 1 x daily - 3-4 x weekly - 2 sets - 10 reps - 5-10 hold  - Supine Posterior Pelvic Tilt  - 1 x daily - 3-4 x weekly - 2 sets - 10 reps - 5-10 hold    IFC electrical stimulation to L lower back with cold pack in prone x 15 minutes

## 2024-03-21 NOTE — PATIENT INSTRUCTIONS
Access Code: [email or web address]  URL: [email or web address]  Date: 03/21/2024  Prepared by: Pravin Schreiber    Exercises  - 90/90 SI Joint Self-Correction with Dowel  - 1 x daily - 3-4 x weekly - 1 sets - 10 reps - 10 hold  - Hooklying Clamshell with Resistance  - 1 x daily - 3-4 x weekly - 2 sets - 10 reps - 5-10 hold  - Sidelying Hip Abduction  - 1 x daily - 3-4 x weekly - 2 sets - 10 reps - 5-10 hold  - Clamshell  - 1 x daily - 3-4 x weekly - 2 sets - 10 reps - 5-10 hold  - Supine Bridge  - 1 x daily - 3-4 x weekly - 2 sets - 10 reps - 5-10 hold  - Supine Posterior Pelvic Tilt  - 1 x daily - 3-4 x weekly - 2 sets - 10 reps - 5-10 hold

## 2024-04-10 ENCOUNTER — APPOINTMENT (OUTPATIENT)
Dept: PHYSICAL THERAPY | Facility: CLINIC | Age: 34
End: 2024-04-10
Payer: COMMERCIAL

## 2024-04-15 ENCOUNTER — HOSPITAL ENCOUNTER (OUTPATIENT)
Dept: RADIOLOGY | Facility: EXTERNAL LOCATION | Age: 34
Discharge: HOME | End: 2024-04-15

## 2024-04-15 ENCOUNTER — OFFICE VISIT (OUTPATIENT)
Dept: ORTHOPEDIC SURGERY | Facility: CLINIC | Age: 34
End: 2024-04-15
Payer: COMMERCIAL

## 2024-04-15 DIAGNOSIS — M25.552 LEFT HIP PAIN: ICD-10-CM

## 2024-04-15 DIAGNOSIS — M53.3 SI (SACROILIAC) JOINT DYSFUNCTION: ICD-10-CM

## 2024-04-15 PROCEDURE — 99213 OFFICE O/P EST LOW 20 MIN: CPT | Performed by: FAMILY MEDICINE

## 2024-04-15 PROCEDURE — 20550 NJX 1 TENDON SHEATH/LIGAMENT: CPT | Performed by: FAMILY MEDICINE

## 2024-04-15 PROCEDURE — 76942 ECHO GUIDE FOR BIOPSY: CPT | Performed by: FAMILY MEDICINE

## 2024-04-15 PROCEDURE — 1036F TOBACCO NON-USER: CPT | Performed by: FAMILY MEDICINE

## 2024-04-15 RX ORDER — BETAMETHASONE SODIUM PHOSPHATE AND BETAMETHASONE ACETATE 3; 3 MG/ML; MG/ML
12 INJECTION, SUSPENSION INTRA-ARTICULAR; INTRALESIONAL; INTRAMUSCULAR; SOFT TISSUE
Status: COMPLETED | OUTPATIENT
Start: 2024-04-15 | End: 2024-04-15

## 2024-04-15 RX ORDER — LIDOCAINE HYDROCHLORIDE 10 MG/ML
6 INJECTION INFILTRATION; PERINEURAL
Status: COMPLETED | OUTPATIENT
Start: 2024-04-15 | End: 2024-04-15

## 2024-04-15 RX ORDER — CYCLOBENZAPRINE HCL 10 MG
10 TABLET ORAL NIGHTLY PRN
Qty: 14 TABLET | Refills: 0 | Status: SHIPPED | OUTPATIENT
Start: 2024-04-15

## 2024-04-15 RX ADMIN — LIDOCAINE HYDROCHLORIDE 6 ML: 10 INJECTION INFILTRATION; PERINEURAL at 20:50

## 2024-04-15 RX ADMIN — BETAMETHASONE SODIUM PHOSPHATE AND BETAMETHASONE ACETATE 12 MG: 3; 3 INJECTION, SUSPENSION INTRA-ARTICULAR; INTRALESIONAL; INTRAMUSCULAR; SOFT TISSUE at 20:50

## 2024-04-16 NOTE — PROGRESS NOTES
Acute Injury New Patient Visit    CC:   Chief Complaint   Patient presents with    Left Hip - Injections     Dr. Keara Castro patient  Here for SI joint or greater troch injection today       HPI: Crystal is a 34 y.o.female who presents today with new complaints of left-sided SI joint pain.  Patient has a history of SI joint issues and is recently seen chiropractic care with intermittent symptomatic relief.  She was seen previously by Dr. Keara Castro who had recommended further evaluation and injection to the left SI joint.  Patient denies any numbness tingling or burning can point directly over the left SI joint as area of pain and discomfort.  She has a little bit of the greater trochanter.  But not nearly as painful as the SI joint.  She states difficulty with excessive weightbearing and with other activities including her duties at work which required to stand frequently for long periods.        Review of Systems   GENERAL: Negative for malaise, significant weight loss, fever  MUSCULOSKELETAL: See HPI  NEURO: Negative for numbness / tingling     Past Medical History  Past Medical History:   Diagnosis Date    Encounter for supervision of normal pregnancy, unspecified, unspecified trimester ([facility]) 07/27/2021    Prenatal care       Medication review  Medication Documentation Review Audit       Reviewed by Cole C Budinsky, MD (Physician) on 04/15/24 at 2049      Medication Order Taking? Sig Documenting Provider Last Dose Status   cyclobenzaprine (Flexeril) 10 mg tablet [id number]  Take 1 tablet (10 mg) by mouth as needed at bedtime for muscle spasms. Cole C Budinsky, MD  Active   levonorgestrel (Mirena) 21 mcg/24 hours (8 yrs) 52 mg IUD [id number] No 52 mg by intrauterine route 1 time. Historical Provider, MD Taking Active   meloxicam (Mobic) 15 mg tablet [id number]  Take 1 tablet (15 mg) by mouth once daily. Keara Patton MD  Active                    Allergies  No Known Allergies    Social History  Social  SUBJECTIVE:   CC: Mark William Osler is an 55 year old male who presents for preventative health visit.     Healthy Habits:     Getting at least 3 servings of Calcium per day:  Yes    Bi-annual eye exam:  NO    Dental care twice a year:  Yes    Sleep apnea or symptoms of sleep apnea:  None    Diet:  Regular (no restrictions)    Frequency of exercise:  4-5 days/week    Duration of exercise:  45-60 minutes    Taking medications regularly:  No    Barriers to taking medications:  None    Medication side effects:  None    PHQ-2 Total Score: 0    Additional concerns today:  No      Today's PHQ-2 Score:   PHQ-2 ( 1999 [employer]) 4/26/2019   Q1: Little interest or pleasure in doing things 0   Q2: Feeling down, depressed or hopeless 0   PHQ-2 Score 0       Abuse: Current or Past(Physical, Sexual or Emotional)- No  Do you feel safe in your environment? Yes    Social History     Tobacco Use     Smoking status: Never Smoker     Smokeless tobacco: Never Used   Substance Use Topics     Alcohol use: Yes     If you drink alcohol do you typically have >3 drinks per day or >7 drinks per week? No        Last PSA: No results found for: PSA    Reviewed orders with patient. Reviewed health maintenance and updated orders accordingly - Yes  Labs reviewed in EPIC    Reviewed and updated as needed this visit by clinical staff         Reviewed and updated as needed this visit by Provider        No past medical history on file.     Review of Systems  CONSTITUTIONAL: NEGATIVE for fever, chills, change in weight  INTEGUMENTARY/SKIN: NEGATIVE for worrisome rashes, moles or lesions  EYES: NEGATIVE for vision changes or irritation  ENT: NEGATIVE for ear, mouth and throat problems  RESP: NEGATIVE for significant cough or SOB  CV: NEGATIVE for chest pain, palpitations or peripheral edema  GI: NEGATIVE for nausea, abdominal pain, heartburn, or change in bowel habits   male: negative for dysuria, hematuria, decreased urinary stream, positive for chronic  "erectile dysfunction  MUSCULOSKELETAL: NEGATIVE for significant arthralgias or myalgia  NEURO: NEGATIVE for weakness, dizziness or paresthesias  PSYCHIATRIC: NEGATIVE for changes in mood or affect    OBJECTIVE:   BP (!) 153/99 (BP Location: Left arm, Patient Position: Sitting, Cuff Size: Adult Regular)   Pulse 75   Temp 97.4  F (36.3  C) (Oral)   Ht 1.77 m (5' 9.7\")   Wt 85.3 kg (188 lb)   SpO2 95%   BMI 27.21 kg/m      Physical Exam  GENERAL: alert and no distress  EYES: Eyes grossly normal to inspection, PERRL and conjunctivae and sclerae normal  HENT: ear canals and TM's normal, nose and mouth without ulcers or lesions  NECK: no adenopathy, no asymmetry, masses, or scars and thyroid normal to palpation  RESP: lungs clear to auscultation - no rales, rhonchi or wheezes  CV: regular rate and rhythm, normal S1 S2, no S3 or S4, no murmur, click or rub, no peripheral edema and peripheral pulses strong  ABDOMEN: soft, nontender, no hepatosplenomegaly, no masses and bowel sounds normal  MS: no gross musculoskeletal defects noted, no edema  SKIN: no suspicious lesions or rashes  NEURO: Normal strength and tone, mentation intact and speech normal  PSYCH: mentation appears normal, affect normal/bright    Diagnostic Test Results:  Results for orders placed or performed during the hospital encounter of 06/28/15   CT Abdomen Pelvis w Contrast    Narrative    CT ABDOMEN PELVIS W CONTRAST 6/28/2015 6:36 AM    HISTORY:  Abdominal pain, left-sided.      TECHNIQUE: 95 mL Isovue 370.  Axial images with coronal  reconstructions.    COMPARISON:  None.    FINDINGS:  The upper abdominal organs are normal other than cysts in  the left kidney.    Mild fat stranding adjacent to a portion of the lower descending  colon, most likely due to mild acute diverticulitis. There is mild  colonic diverticulosis. No evidence for perforation. Bowel and  mesentery otherwise unremarkable. The appendix is normal.    Mild atherosclerotic vascular " History     Socioeconomic History    Marital status:      Spouse name: Not on file    Number of children: Not on file    Years of education: Not on file    Highest education level: Not on file   Occupational History    Not on file   Tobacco Use    Smoking status: Never     Passive exposure: Never    Smokeless tobacco: Never   Vaping Use    Vaping status: Never Used   Substance and Sexual Activity    Alcohol use: Never    Drug use: Never    Sexual activity: Not on file   Other Topics Concern    Not on file   Social History Narrative    Not on file     Social Determinants of Health     Financial Resource Strain: Not on file   Food Insecurity: Not on file   Transportation Needs: Not on file   Physical Activity: Not on file   Stress: Not on file   Social Connections: Not on file   Intimate Partner Violence: Not on file   Housing Stability: Not on file       Surgical History  Past Surgical History:   Procedure Laterality Date    COLONOSCOPY      OVARY SURGERY      TONSILLECTOMY         Physical Exam:  GENERAL:  Patient is awake, alert, and oriented to person place and time.  Patient appears well nourished and well kept.  Affect Calm, Not Acutely Distressed.  HEENT:  Normocephalic, Atraumatic, EOMI  CARDIOVASCULAR:  Hemodynamically stable.  RESPIRATORY:  Normal respirations with unlabored breathing.  NEURO: Gross sensation intact to the lower extremities bilaterally.  Extremity: Patient with otherwise normal-appearing gait tenderness palpation over the left SI joint no significant left piriformis pain mild left greater trochanter bursa pain.  Normal muscle bulk and tone.  No redness warmth or erythema over the targeted area of injection.      Diagnostics: Previous x-rays reviewed alongside with the patient, presence of mild cam deformity on the left hip was demonstrated and discussed with the patient        Procedure: US Guided left SI Joint Injection:    Before aspiration/injection, the risks  of this procedure  calcification.      Impression    IMPRESSION:  Probable mild acute diverticulitis of the distal  descending colon.    KOTA ROBINS MD   CBC with platelets + differential   Result Value Ref Range    WBC 9.5 4.0 - 11.0 10e9/L    RBC Count 5.03 4.4 - 5.9 10e12/L    Hemoglobin 16.1 13.3 - 17.7 g/dL    Hematocrit 43.8 40.0 - 53.0 %    MCV 87 78 - 100 fl    MCH 32.0 26.5 - 33.0 pg    MCHC 36.8 (H) 31.5 - 36.5 g/dL    RDW 12.8 10.0 - 15.0 %    Platelet Count 234 150 - 450 10e9/L    Diff Method Automated Method     % Neutrophils 68.8 %    % Lymphocytes 20.3 %    % Monocytes 7.7 %    % Eosinophils 2.4 %    % Basophils 0.5 %    % Immature Granulocytes 0.3 %    Absolute Neutrophil 6.6 1.6 - 8.3 10e9/L    Absolute Lymphocytes 1.9 0.8 - 5.3 10e9/L    Absolute Monocytes 0.7 0.0 - 1.3 10e9/L    Absolute Eosinophils 0.2 0.0 - 0.7 10e9/L    Absolute Basophils 0.1 0.0 - 0.2 10e9/L   Comprehensive metabolic panel   Result Value Ref Range    Sodium 140 133 - 144 mmol/L    Potassium 3.7 3.4 - 5.3 mmol/L    Chloride 108 94 - 109 mmol/L    Carbon Dioxide 24 20 - 32 mmol/L    Anion Gap 8 3 - 14 mmol/L    Glucose 102 (H) 70 - 99 mg/dL    Urea Nitrogen 20 7 - 30 mg/dL    Creatinine 0.91 0.66 - 1.25 mg/dL    GFR Estimate 88 >60 mL/min/1.7m2    GFR Estimate If Black >90   GFR Calc   >60 mL/min/1.7m2    Calcium 8.5 8.5 - 10.1 mg/dL    Bilirubin Total 0.5 0.2 - 1.3 mg/dL    Albumin 3.9 3.4 - 5.0 g/dL    Protein Total 7.4 6.8 - 8.8 g/dL    Alkaline Phosphatase 57 40 - 150 U/L    ALT 39 0 - 70 U/L    AST 20 0 - 45 U/L   UA with Microscopic   Result Value Ref Range    Color Urine Light Yellow     Appearance Urine Clear     Glucose Urine Negative NEG mg/dL    Bilirubin Urine Negative NEG    Ketones Urine Negative NEG mg/dL    Specific Gravity Urine 1.030 1.003 - 1.035    Blood Urine Negative NEG    pH Urine 6.5 5.0 - 7.0 pH    Protein Albumin Urine Negative NEG mg/dL    Urobilinogen mg/dL Normal 0.0 - 2.0 mg/dL    Nitrite Urine  including but not limited to;  infection, local skin irritation, skin atrophy, calcification, continued pain or discomfort, elevated blood sugar, burning, failure to relieve pain, possible late infection were all discussed with the patient.  The patient verbalized understanding and consented to the procedure.     After informed consent was provided, patient identification was confirmed, and allergies were verified, the patient was appropriately positioned. The patient's [Left/] SI Joint was evaluated via ultrasound in both the short and long axis to identify the intraarticular space.    The site was marked and time-out performed.  The injection site was prepped in the usual sterile manner to provide a sterile environment.     The skin was anesthetized with ethyl chloride spray. The aspiration/injection was performed with standard technique. A 22G Spinal needle was passed through the skin into the joint space in a medial to lateral approach with direct ultrasound guidance under sterile precautions. Next, [8] cc´s of injectate consisting of [2] cc´s of [Celestone/] and [6] cc´s of 1% lidocaine without epinephrine was instilled into the joint space.    The needle was withdrawn and the puncture site was secured with a Band-Aid. The patient tolerated the procedure well without complication.     Post-procedure discomfort can be alleviated with additional medication, ice, elevation, and rest over the first 24 hours as recommended.    Trigger Point Injection (Left SI joint) on 4/15/2024 8:50 PM  Indications: pain and therapeutic benefit  Details: 22 G needle, ultrasound-guided  Medications: 12 mg betamethasone acet,sod phos 6 mg/mL; 6 mL lidocaine 10 mg/mL (1 %)  Outcome: tolerated well, no immediate complications (Associated delayed vasovagal response cleared quickly with rest ice water and cool air.)  Procedure, treatment alternatives, risks and benefits explained, specific risks discussed. Consent was given by the  "Negative NEG    Leukocyte Esterase Urine Negative NEG    Source Midstream Urine     WBC Urine <1 0 - 2 /HPF    RBC Urine 0 0 - 2 /HPF    Mucous Urine Present (A) NEG /LPF       ASSESSMENT/PLAN:   (Z00.00) Routine history and physical examination of adult  (primary encounter diagnosis)  Comment: yearly physical exam today  Plan: **CBC with platelets FUTURE anytime, **Basic         metabolic panel FUTURE anytime      (Z12.5) Screening for prostate cancer  Comment: patient is due for PSA lab  Plan: PSA, screen      (Z13.1) Screening for diabetes mellitus  Comment: patient is due for Hgb A1c lab  Plan: **A1C FUTURE anytime      (Z13.220) Lipid screening  Comment: patient is due for Lipid screening  Plan: Lipid panel reflex to direct LDL Fasting      (I10) Benign essential hypertension  Comment: BP is not well controlled. Patient was previously on Hyzaar.  Plan: I have restarted the patient on his previous losartan-hydrochlorothiazide (HYZAAR) 50-12.5         MG tablet BP medication      (N52.9) Erectile dysfunction, unspecified erectile dysfunction type  Comment: symptoms stable on Cialis. patient is due for a refill of his Cialis ED medication.  Plan: tadalafil (CIALIS) 20 MG tablet      COUNSELING:   Reviewed preventive health counseling, as reflected in patient instructions  Special attention given to:        Regular exercise       Healthy diet/nutrition    BP Readings from Last 1 Encounters:   06/28/15 (!) 148/95     Estimated body mass index is 27.69 kg/m  as calculated from the following:    Height as of 6/28/15: 1.778 m (5' 10\").    Weight as of 6/28/15: 87.5 kg (193 lb).      Weight management plan: Discussed healthy diet and exercise guidelines     reports that he has never smoked. He has never used smokeless tobacco.      Counseling Resources:  ATP IV Guidelines  Pooled Cohorts Equation Calculator  FRAX Risk Assessment  ICSI Preventive Guidelines  Dietary Guidelines for Americans, 2010  USDA's MyPlate  ASA " patient. Immediately prior to procedure a time out was called to verify the correct patient, procedure, equipment, support staff and site/side marked as required. Patient was prepped and draped in the usual sterile fashion.           Assessment:   Problem List Items Addressed This Visit       Left hip pain    Relevant Medications    cyclobenzaprine (Flexeril) 10 mg tablet    Other Relevant Orders    Point of Care Ultrasound (Completed)     Other Visit Diagnoses       SI (sacroiliac) joint dysfunction        Relevant Medications    cyclobenzaprine (Flexeril) 10 mg tablet             Plan: Patient received and tolerated the injection did have a little bit of vasovagal response shortly after procedure however this was able to be resolved with some rest and some ice water.  Will plan on seeing the patient back in 6 weeks for repeat evaluation will consider potential greater trochanter bursa injection on the left if necessary.  She was given some home exercises here today.  She stated that previous physical therapy co-pays were extremely expensive and cost prohibitive to her.  She will call or return sooner with any issues.  No need for x-rays next visit.  Patient was also interested in potential muscle relaxer to assist with nocturnal symptoms.  Orders Placed This Encounter    Trigger Point Injection    Point of Care Ultrasound    cyclobenzaprine (Flexeril) 10 mg tablet      At the conclusion of the visit there were no further questions by the patient/family regarding their plan of care.  Patient was instructed to call or return with any issues, questions, or concerns regarding their injury and/or treatment plan described above.     04/15/24 at 8:53 PM - Cole C Budinsky, MD    Office: [phone]    This note was prepared using voice recognition software.  The details of this note are correct and have been reviewed, and corrected to the best of my ability.  Some grammatical errors may persist related to the Dragon  Prophylaxis  Lung CA Screening    Ashleigh Nicole MD  [facility]   software.

## 2024-04-18 ENCOUNTER — APPOINTMENT (OUTPATIENT)
Dept: PHYSICAL THERAPY | Facility: CLINIC | Age: 34
End: 2024-04-18
Payer: COMMERCIAL

## 2024-04-24 ENCOUNTER — APPOINTMENT (OUTPATIENT)
Dept: PHYSICAL THERAPY | Facility: CLINIC | Age: 34
End: 2024-04-24
Payer: COMMERCIAL

## 2024-05-01 ENCOUNTER — APPOINTMENT (OUTPATIENT)
Dept: PHYSICAL THERAPY | Facility: CLINIC | Age: 34
End: 2024-05-01
Payer: COMMERCIAL

## 2024-05-09 ENCOUNTER — APPOINTMENT (OUTPATIENT)
Dept: PHYSICAL THERAPY | Facility: CLINIC | Age: 34
End: 2024-05-09
Payer: COMMERCIAL

## 2024-06-03 ENCOUNTER — APPOINTMENT (OUTPATIENT)
Dept: ORTHOPEDIC SURGERY | Facility: CLINIC | Age: 34
End: 2024-06-03
Payer: COMMERCIAL

## 2024-06-05 ENCOUNTER — APPOINTMENT (OUTPATIENT)
Dept: PRIMARY CARE | Facility: CLINIC | Age: 34
End: 2024-06-05
Payer: COMMERCIAL

## 2024-06-10 ENCOUNTER — APPOINTMENT (OUTPATIENT)
Dept: ALLERGY | Facility: CLINIC | Age: 34
End: 2024-06-10
Payer: COMMERCIAL

## 2024-10-30 ENCOUNTER — APPOINTMENT (OUTPATIENT)
Dept: OBSTETRICS AND GYNECOLOGY | Facility: CLINIC | Age: 34
End: 2024-10-30
Payer: COMMERCIAL

## 2024-10-30 VITALS — DIASTOLIC BLOOD PRESSURE: 72 MMHG | SYSTOLIC BLOOD PRESSURE: 116 MMHG | BODY MASS INDEX: 27.16 KG/M2 | WEIGHT: 178.6 LBS

## 2024-10-30 DIAGNOSIS — Z30.432 ENCOUNTER FOR IUD REMOVAL: ICD-10-CM

## 2024-10-30 DIAGNOSIS — N64.4 BREAST PAIN, LEFT: Primary | ICD-10-CM

## 2024-10-30 PROCEDURE — 99212 OFFICE O/P EST SF 10 MIN: CPT | Performed by: ADVANCED PRACTICE MIDWIFE

## 2024-10-30 PROCEDURE — 58301 REMOVE INTRAUTERINE DEVICE: CPT | Performed by: ADVANCED PRACTICE MIDWIFE

## 2024-12-17 ENCOUNTER — APPOINTMENT (OUTPATIENT)
Dept: ORTHOPEDIC SURGERY | Facility: CLINIC | Age: 34
End: 2024-12-17
Payer: COMMERCIAL

## 2025-01-07 ENCOUNTER — OFFICE VISIT (OUTPATIENT)
Dept: URGENT CARE | Age: 35
End: 2025-01-07
Payer: COMMERCIAL

## 2025-01-07 VITALS
OXYGEN SATURATION: 99 % | HEART RATE: 74 BPM | WEIGHT: 175 LBS | HEIGHT: 68 IN | RESPIRATION RATE: 20 BRPM | SYSTOLIC BLOOD PRESSURE: 126 MMHG | TEMPERATURE: 98.6 F | DIASTOLIC BLOOD PRESSURE: 85 MMHG | BODY MASS INDEX: 26.52 KG/M2

## 2025-01-07 DIAGNOSIS — H65.01 NON-RECURRENT ACUTE SEROUS OTITIS MEDIA OF RIGHT EAR: Primary | ICD-10-CM

## 2025-01-07 PROCEDURE — 1036F TOBACCO NON-USER: CPT | Performed by: NURSE PRACTITIONER

## 2025-01-07 PROCEDURE — 99203 OFFICE O/P NEW LOW 30 MIN: CPT | Performed by: NURSE PRACTITIONER

## 2025-01-07 PROCEDURE — 3008F BODY MASS INDEX DOCD: CPT | Performed by: NURSE PRACTITIONER

## 2025-01-07 RX ORDER — AMOXICILLIN AND CLAVULANATE POTASSIUM 875; 125 MG/1; MG/1
1 TABLET, FILM COATED ORAL 2 TIMES DAILY
Qty: 20 TABLET | Refills: 0 | Status: SHIPPED | OUTPATIENT
Start: 2025-01-07 | End: 2025-01-17

## 2025-01-07 RX ORDER — PREDNISONE 20 MG/1
20 TABLET ORAL DAILY
Qty: 7 TABLET | Refills: 0 | Status: SHIPPED | OUTPATIENT
Start: 2025-01-07 | End: 2025-01-14

## 2025-01-07 NOTE — PROGRESS NOTES
"Subjective   Patient ID: Crystal Newsome is a 34 y.o. female. They present today with a chief complaint of Earache (Right ear pain x 3 days - right side of face is painful from it).    History of Present Illness  Pt presents to the  with the c/o right ear pain and facial pain x 3 days. Pt states hearing is muffled on that side. States was sick with URI like symptoms last week. No fever or other associated symptoms at this time. No other concerns to address at this time.       Earache         Past Medical History  Allergies as of 01/07/2025    (No Known Allergies)       (Not in a hospital admission)       Past Medical History:   Diagnosis Date    Encounter for supervision of normal pregnancy, unspecified, unspecified trimester 07/27/2021    Prenatal care       Past Surgical History:   Procedure Laterality Date    COLONOSCOPY      OVARY SURGERY      TONSILLECTOMY          reports that she has never smoked. She has never been exposed to tobacco smoke. She has never used smokeless tobacco. She reports that she does not drink alcohol and does not use drugs.    Review of Systems  Review of Systems   HENT:  Positive for ear pain.    10 point ROS completed and all are negative other than what is stated in the current HPI                                 Objective    Vitals:    01/07/25 1607   BP: 126/85   BP Location: Left arm   Patient Position: Sitting   Pulse: 74   Resp: 20   Temp: 37 °C (98.6 °F)   SpO2: 99%   Weight: 79.4 kg (175 lb)   Height: 1.727 m (5' 8\")     Patient's last menstrual period was 12/20/2024.    Physical Exam  Vitals and nursing note reviewed.   Constitutional:       Appearance: Normal appearance.   HENT:      Head: Normocephalic and atraumatic.      Right Ear: Decreased hearing noted. Tenderness present. No drainage. No mastoid tenderness. Tympanic membrane is erythematous and retracted. Tympanic membrane has decreased mobility.      Left Ear: Tympanic membrane, ear canal and external ear normal. "   Cardiovascular:      Rate and Rhythm: Normal rate and regular rhythm.   Pulmonary:      Effort: Pulmonary effort is normal.      Breath sounds: Normal breath sounds.   Skin:     General: Skin is warm and dry.   Neurological:      Mental Status: She is alert and oriented to person, place, and time.   Psychiatric:         Behavior: Behavior normal.         Procedures    Point of Care Test & Imaging Results from this visit  No results found for this visit on 01/07/25.   No results found.    Diagnostic study results (if any) were reviewed by IZZY Garcia.    Assessment/Plan   Allergies, medications, history, and pertinent labs/EKGs/Imaging reviewed by IZZY Garcia.     Medical Decision Making  Right Acute Otitis Media:  - Keep ears clean and dry  - Take abx as instructed  - f/u with PCP (or peds provider) by middle to end of next week for re-evaluation  - Tylenol/Motrin as needed for pain  - Good oral hydration  - OTC decongestant for nasal congestion  - Advised on s/s to seek emergent care for    Orders and Diagnoses  There are no diagnoses linked to this encounter.    Medical Admin Record      Patient disposition: Home    Electronically signed by IZZY Garcia  4:11 PM

## 2025-01-07 NOTE — PATIENT INSTRUCTIONS
Right Acute Otitis Media:  - Keep ears clean and dry  - Take abx as instructed  - f/u with PCP (or peds provider) by middle to end of next week for re-evaluation  - Tylenol/Motrin as needed for pain  - Good oral hydration  - OTC decongestant for nasal congestion  - Advised on s/s to seek emergent care for

## 2025-02-08 ENCOUNTER — HOSPITAL ENCOUNTER (EMERGENCY)
Facility: HOSPITAL | Age: 35
Discharge: HOME | End: 2025-02-08
Attending: STUDENT IN AN ORGANIZED HEALTH CARE EDUCATION/TRAINING PROGRAM
Payer: COMMERCIAL

## 2025-02-08 ENCOUNTER — OFFICE VISIT (OUTPATIENT)
Dept: URGENT CARE | Age: 35
End: 2025-02-08
Payer: COMMERCIAL

## 2025-02-08 ENCOUNTER — APPOINTMENT (OUTPATIENT)
Dept: RADIOLOGY | Facility: HOSPITAL | Age: 35
End: 2025-02-08
Payer: COMMERCIAL

## 2025-02-08 VITALS
OXYGEN SATURATION: 99 % | RESPIRATION RATE: 15 BRPM | SYSTOLIC BLOOD PRESSURE: 145 MMHG | DIASTOLIC BLOOD PRESSURE: 72 MMHG | BODY MASS INDEX: 26.61 KG/M2 | WEIGHT: 175 LBS | TEMPERATURE: 97.8 F

## 2025-02-08 VITALS
HEIGHT: 63 IN | TEMPERATURE: 97.8 F | BODY MASS INDEX: 31.01 KG/M2 | SYSTOLIC BLOOD PRESSURE: 130 MMHG | OXYGEN SATURATION: 98 % | RESPIRATION RATE: 16 BRPM | WEIGHT: 175 LBS | HEART RATE: 72 BPM | DIASTOLIC BLOOD PRESSURE: 93 MMHG

## 2025-02-08 DIAGNOSIS — S83.92XA SPRAIN OF LEFT KNEE, INITIAL ENCOUNTER: ICD-10-CM

## 2025-02-08 DIAGNOSIS — M25.562 ACUTE PAIN OF LEFT KNEE: Primary | ICD-10-CM

## 2025-02-08 DIAGNOSIS — M53.3 SI (SACROILIAC) JOINT DYSFUNCTION: ICD-10-CM

## 2025-02-08 DIAGNOSIS — M25.552 LEFT HIP PAIN: ICD-10-CM

## 2025-02-08 PROCEDURE — 99283 EMERGENCY DEPT VISIT LOW MDM: CPT | Performed by: STUDENT IN AN ORGANIZED HEALTH CARE EDUCATION/TRAINING PROGRAM

## 2025-02-08 PROCEDURE — 2500000001 HC RX 250 WO HCPCS SELF ADMINISTERED DRUGS (ALT 637 FOR MEDICARE OP): Performed by: STUDENT IN AN ORGANIZED HEALTH CARE EDUCATION/TRAINING PROGRAM

## 2025-02-08 PROCEDURE — 2500000001 HC RX 250 WO HCPCS SELF ADMINISTERED DRUGS (ALT 637 FOR MEDICARE OP)

## 2025-02-08 PROCEDURE — 73564 X-RAY EXAM KNEE 4 OR MORE: CPT | Mod: LEFT SIDE | Performed by: RADIOLOGY

## 2025-02-08 PROCEDURE — 73564 X-RAY EXAM KNEE 4 OR MORE: CPT | Mod: LT

## 2025-02-08 RX ORDER — CYCLOBENZAPRINE HCL 10 MG
5 TABLET ORAL NIGHTLY PRN
Qty: 14 TABLET | Refills: 0 | Status: SHIPPED | OUTPATIENT
Start: 2025-02-08

## 2025-02-08 RX ORDER — CYCLOBENZAPRINE HCL 5 MG
5 TABLET ORAL ONCE
Status: COMPLETED | OUTPATIENT
Start: 2025-02-08 | End: 2025-02-08

## 2025-02-08 RX ORDER — ACETAMINOPHEN 325 MG/1
650 TABLET ORAL ONCE
Status: COMPLETED | OUTPATIENT
Start: 2025-02-08 | End: 2025-02-08

## 2025-02-08 RX ADMIN — ACETAMINOPHEN 650 MG: 325 TABLET ORAL at 09:52

## 2025-02-08 RX ADMIN — CYCLOBENZAPRINE HYDROCHLORIDE 5 MG: 5 TABLET, FILM COATED ORAL at 09:52

## 2025-02-08 ASSESSMENT — PAIN - FUNCTIONAL ASSESSMENT
PAIN_FUNCTIONAL_ASSESSMENT: 0-10
PAIN_FUNCTIONAL_ASSESSMENT: 0-10

## 2025-02-08 ASSESSMENT — PAIN SCALES - GENERAL
PAINLEVEL_OUTOF10: 10 - WORST POSSIBLE PAIN
PAINLEVEL_OUTOF10: 6

## 2025-02-08 ASSESSMENT — COLUMBIA-SUICIDE SEVERITY RATING SCALE - C-SSRS
2. HAVE YOU ACTUALLY HAD ANY THOUGHTS OF KILLING YOURSELF?: NO
1. IN THE PAST MONTH, HAVE YOU WISHED YOU WERE DEAD OR WISHED YOU COULD GO TO SLEEP AND NOT WAKE UP?: NO
6. HAVE YOU EVER DONE ANYTHING, STARTED TO DO ANYTHING, OR PREPARED TO DO ANYTHING TO END YOUR LIFE?: NO

## 2025-02-08 ASSESSMENT — PAIN DESCRIPTION - PAIN TYPE: TYPE: ACUTE PAIN

## 2025-02-08 ASSESSMENT — PAIN DESCRIPTION - ORIENTATION: ORIENTATION: LEFT

## 2025-02-08 ASSESSMENT — PAIN DESCRIPTION - LOCATION: LOCATION: KNEE

## 2025-02-08 NOTE — ED PROVIDER NOTES
EMERGENCY DEPARTMENT ENCOUNTER      Pt Name: Crystal Newsome  MRN: [id number]  Birthdate 1990  Date of evaluation: 2/8/2025  Provider: Fidelina Murillo MD    CHIEF COMPLAINT       Chief Complaint   Patient presents with    Knee Pain     HISTORY OF PRESENT ILLNESS    Crystal Newsome is a 34 y.o. year old female who presents to the ER for left knee pain.  The patient reports that in the middle of the night she jumped out of her bed while half-asleep and landed on her left knee.  The patient reports that since the fall, she was not able to bend her left knee or bear a lot of weight due to the pain.  She took Tylenol and aspirin at 1 AM without relief.  She iced the knee for a couple hours.  The patient is concerned that her knee still feels cold to the touch, 4 hours after she finished icing it.  The patient denies hitting her head, loss of consciousness, neck pain.     PAST MEDICAL HISTORY     Past Medical History:   Diagnosis Date    Encounter for supervision of normal pregnancy, unspecified, unspecified trimester 07/27/2021    Prenatal care     CURRENT MEDICATIONS       Previous Medications    LEVONORGESTREL (MIRENA) 21 MCG/24 HOURS (8 YRS) 52 MG IUD    52 mg by intrauterine route 1 time.     SURGICAL HISTORY       Past Surgical History:   Procedure Laterality Date    COLONOSCOPY      OVARY SURGERY      TONSILLECTOMY       ALLERGIES     Patient has no known allergies.  FAMILY HISTORY       Family History   Problem Relation Name Age of Onset    Diverticulitis Mother      Diabetes Father      Hashimoto's thyroiditis Father      Other (silent reflux) Sister      Ulcerative colitis Maternal Grandfather      Crohn's disease Maternal Grandfather      Heart disease Paternal Grandmother       SOCIAL HISTORY       Social History     Tobacco Use    Smoking status: Never     Passive exposure: Never    Smokeless tobacco: Never   Vaping Use    Vaping status: Never Used   Substance Use Topics    Alcohol use: Never    Drug use:  Never     PHYSICAL EXAM  (up to 7 for level 4, 8 or more for level 5)     ED Triage Vitals [02/08/25 0910]   Temperature Heart Rate Respirations BP   36.6 °C (97.8 °F) 76 15 146/76      Pulse Ox Temp Source Heart Rate Source Patient Position   100 % Tympanic Monitor Sitting      BP Location FiO2 (%)     Right arm --       Physical Exam  Constitutional:       Appearance: Normal appearance.   HENT:      Head: Normocephalic and atraumatic.   Cardiovascular:      Rate and Rhythm: Normal rate and regular rhythm.      Pulses: Normal pulses.      Heart sounds: Normal heart sounds.   Pulmonary:      Effort: Pulmonary effort is normal.      Breath sounds: Normal breath sounds.   Abdominal:      General: Abdomen is flat.      Palpations: Abdomen is soft.   Musculoskeletal:      Cervical back: Normal range of motion. No tenderness.      Right knee: Normal.      Left knee: No effusion, erythema, ecchymosis, lacerations, bony tenderness or crepitus. Decreased range of motion. No LCL laxity, MCL laxity, ACL laxity or PCL laxity.Normal meniscus and normal patellar mobility. Normal pulse.      Instability Tests: Anterior drawer test negative. Posterior drawer test negative. Anterior Lachman test negative. Lateral Angelina test negative.   Skin:     General: Skin is warm and dry.      Capillary Refill: Capillary refill takes less than 2 seconds.   Neurological:      General: No focal deficit present.      Mental Status: She is alert.        DIAGNOSTIC RESULTS   LABS:  Labs Reviewed - No data to display  All other labs were within normal range or not returned as of this dictation.  Imaging  XR knee left 4+ views   Final Result   Unremarkable left knee series.        MACRO:   None.        Signed by: Morro Munoz 2/8/2025 10:43 AM   Dictation workstation:   [id number]         Procedure  Procedures  EMERGENCY DEPARTMENT COURSE/MDM:   Medical Decision Making    Vitals:    Vitals:    02/08/25 0910   BP: 146/76   BP Location: Right  "arm   Patient Position: Sitting   Pulse: 76   Resp: 15   Temp: 36.6 °C (97.8 °F)   TempSrc: Tympanic   SpO2: 100%   Weight: 79.4 kg (175 lb)   Height: 1.6 m (5' 3\")     Crystal Newsome is a female 34 y.o. who presents to the ER for left knee pain.. On arrival the patients vital signs were: Afebrile, regular heart rate, normotensive, regular respiration rate, normoxic on room air. History obtained from: patient.     Differential diagnoses include fractures, dislocations, muscle strain, ligamentous injury.    Physical exam is significant for a tender left knee on flexion.  There is no obvious injury or deformity on visual inspection of the left knee.  The patient is able to flex and extend her knee, slightly limited by pain.  The anterior/posterior drawer and Lachman's test were within normal limits.  Angelina's test did not show laxity.  Triplett pedis pulse was 2+, cap refills less than 2 seconds, patient is able to wiggle her toes, ankle inversion and eversion was within normal limits, sensation was intact.  The patient is able to bear weight on her knee, though there is pain.     X-ray of the left knee did not show any acute dislocations or fractures.     The patient was given Flexeril for her muscle spasms.  On reassessment, the patient reports that her knee pain is still there however her muscle spasms have improved.  The patient is able to bear weight and walk.  She was offered crutches but declined because she has already bought crutches.    The patient was discharged with a prescription of Flexeril and given return precautions. The patient was instructed to follow up with the orthopedic clinic and with their PCP in one week. The patient understood and was agreeable with the plan.     ED Course as of 02/08/25 1102   Sat Feb 08, 2025   0947 Anterior/posterior drawer and lachman's test WNL. All other provocative testing negative (Angelina's, varus/valgus test).  2+ dorsalis pedis posterior tibial pulses.  Cap refill " is in 2 seconds.  Patient presented to the emergency department requesting MRI.  I advised her that this will need to be performed outpatient.  Should x-ray be negative I would suspect likely ligamentous strain and she can be weightbearing as tolerated with orthopedic follow-up. [TL]      ED Course User Index  [TL] Jacques Ahmadi DO         Diagnoses as of 02/08/25 1102   Sprain of left knee, initial encounter   Acute pain of left knee       External Records Reviewed: I reviewed recent and relevant outside records including inpatient notes, outpatient records      Shared decision making for disposition  Patient and/or patient´s representative was counseled regarding labs, imaging, likely diagnosis. All questions were answered. Recommendation was made   for discharge home. The patient agreed and was discharged home in stable condition with appropriate relevant educational materials. Return precautions were provided which included increasing pain, numbness, tingling, weakness, loss of motion in your arms or legs, loss of control of your urine or stool, fever, abdominal pain, chest pain, shortness of breath, or any new or worsening symptoms..     ED Medications administered this visit:    Medications   acetaminophen (Tylenol) tablet 650 mg (650 mg oral Given 2/8/25 0952)   cyclobenzaprine (Flexeril) tablet 5 mg (5 mg oral Given 2/8/25 0952)       New Prescriptions from this visit:    New Prescriptions    No medications on file       Follow-up:  Keith Lai MD  [address] [place] [address] Dr Hammer [address] [place] [id number] [phone]    In 1 week          Final Impression:   1. Acute pain of left knee    2. Sprain of left knee, initial encounter    3. Left hip pain    4. SI (sacroiliac) joint dysfunction          Please excuse any misspellings or unintended errors related to the Dragon speech recognition software used to dictate this note.    I reviewed the case with the attending ED physician. The attending ED physician agrees  with the plan.      Fidelina Murillo MD  Resident  02/08/25 [id number]

## 2025-02-08 NOTE — Clinical Note
Crystal Newsome was seen and treated in our emergency department on 2/8/2025.  She may return to work on 02/14/2025.       If you have any questions or concerns, please don't hesitate to call.      Fidelina Murillo MD

## 2025-02-08 NOTE — ED TRIAGE NOTES
"C/o left knee pain... pt jumped out of bed in the middle of the night, and when went to bed knee to walk had \"crazy pain\" and walks with significant limp and unable to bend knee. Took tylenol and aspirin 0100am without relief and iced  "

## 2025-02-08 NOTE — PROGRESS NOTES
Subjective   Patient ID: Crystal Newsome is a 34 y.o. female. They present today with a chief complaint of Injury (Left knee. Go toout of bed, and pain in knee when bending knee. Knee feels col. Pt wants MRI. ).    History of Present Illness  Patient is a 34-year-old female who complains of acute left knee pain secondary to a twisting injury that she sustained at home approximately 5 hours PTA this morning.  Patient reports that she is unable to bear weight secondary to pain.  Patient reports mild paresthesia to her left leg in addition to left knee pain.  Patient has no history of fracture or surgery to her left knee.  Patient reports no fall or direct blow injury to her left knee.  Patient states that she is highly suspicious for a soft tissue tear and wants an immediate MRI to evaluate for same.  Patient arrives at this urgent care facility with the assistance of her  who obtained a very nice [facility] knee immobilizer which the patient has correctly placed.  Patient denies pain or injury to her left hip and ankle.      Injury    Past Medical History  Allergies as of 02/08/2025    (No Known Allergies)     Past Medical History:   Diagnosis Date    Encounter for supervision of normal pregnancy, unspecified, unspecified trimester 07/27/2021    Prenatal care       Past Surgical History:   Procedure Laterality Date    COLONOSCOPY      OVARY SURGERY      TONSILLECTOMY          reports that she has never smoked. She has never been exposed to tobacco smoke. She has never used smokeless tobacco. She reports that she does not drink alcohol and does not use drugs.    Review of Systems  Review of Systems   Musculoskeletal:         Left Knee Pain   All other systems reviewed and are negative.    Objective    Vitals:    02/08/25 0817   BP: 145/72   Resp: 15   Temp: 36.6 °C (97.8 °F)   SpO2: 99%   Weight: 79.4 kg (175 lb)     No LMP recorded.    Physical Exam  Vitals and nursing note reviewed.   Constitutional:        Appearance: Normal appearance. She is normal weight.   HENT:      Head: Normocephalic.      Nose: Nose normal.      Mouth/Throat:      Mouth: Mucous membranes are moist.      Pharynx: Oropharynx is clear.   Eyes:      Extraocular Movements: Extraocular movements intact.      Conjunctiva/sclera: Conjunctivae normal.      Pupils: Pupils are equal, round, and reactive to light.   Cardiovascular:      Rate and Rhythm: Normal rate and regular rhythm.      Pulses: Normal pulses.      Heart sounds: Normal heart sounds.   Pulmonary:      Effort: Pulmonary effort is normal.      Breath sounds: Normal breath sounds.   Abdominal:      General: Abdomen is flat.      Palpations: Abdomen is soft.   Musculoskeletal:         General: Tenderness and signs of injury present. No deformity.      Cervical back: Normal range of motion and neck supple.   Skin:     General: Skin is warm and dry.      Capillary Refill: Capillary refill takes less than 2 seconds.   Neurological:      General: No focal deficit present.      Mental Status: She is alert and oriented to person, place, and time.   Psychiatric:         Mood and Affect: Mood normal.         Behavior: Behavior normal.         Thought Content: Thought content normal.         Judgment: Judgment normal.     Point of Care Test & Imaging Results from this visit  No results found for this visit on 02/08/25.   No results found.    Diagnostic study results (if any) were reviewed by Alan Townsend PA-C.    Assessment/Plan   Allergies, medications, history, and pertinent labs/EKGs/Imaging reviewed by Alan Townsend PA-C.     Medical Decision Making  Physical exam findings as noted above.  Patient was first advised that MRI studies are not possible at this urgent care facility and orders for same also cannot be placed.  Patient was offered x-ray left knee, however she is correct in that there is very low suspicion for fracture.  Patient was also informed that pain medication is not available  at this urgent care.  Patient was advised that she should report to an emergency department for more complete evaluation and management.  Patient states that her  will be returning momentarily with crutches.    CLINICAL IMPRESSION:  Left Knee Pain    Orders and Diagnoses  There are no diagnoses linked to this encounter.    Patient disposition: Emergency Department    Electronically signed by Alan Townsend PA-C  8:29 AM

## 2025-02-10 ENCOUNTER — APPOINTMENT (OUTPATIENT)
Dept: ORTHOPEDIC SURGERY | Facility: CLINIC | Age: 35
End: 2025-02-10
Payer: COMMERCIAL

## 2025-02-12 ENCOUNTER — OFFICE VISIT (OUTPATIENT)
Dept: ORTHOPEDIC SURGERY | Facility: CLINIC | Age: 35
End: 2025-02-12
Payer: COMMERCIAL

## 2025-02-12 DIAGNOSIS — M23.92 INTERNAL DERANGEMENT OF LEFT KNEE: Primary | ICD-10-CM

## 2025-02-12 DIAGNOSIS — S83.92XA SPRAIN OF LEFT KNEE, INITIAL ENCOUNTER: ICD-10-CM

## 2025-02-12 PROCEDURE — 1036F TOBACCO NON-USER: CPT | Performed by: INTERNAL MEDICINE

## 2025-02-12 PROCEDURE — 99213 OFFICE O/P EST LOW 20 MIN: CPT | Performed by: INTERNAL MEDICINE

## 2025-02-12 NOTE — LETTER
February 12, 2025     Patient: Crystal Newsome   YOB: 1990   Date of Visit: 2/12/2025       To Whom It May Concern:    It is my medical opinion that Crystal Newsome may return to work on 02/17/25 .    If you have any questions or concerns, please don't hesitate to call.         Sincerely,        Deb Wolfe MD    CC: No Recipients

## 2025-02-12 NOTE — PROGRESS NOTES
Acute Injury New Patient Visit    CC: No chief complaint on file.      HPI: Crystal is a 34 y.o. female presents today for evaluation for acute left knee injury sustained four days after she twisted her knee getting out of bed. She notes worsening pain and instability. She was evaluated in the ER where x-rays were taken. She is here for initial evaluation.         Review of Systems   GENERAL: Negative for malaise, significant weight loss, fever  MUSCULOSKELETAL: See HPI  NEURO:  Negative for numbness / tingling     Past Medical History  Past Medical History:   Diagnosis Date    Encounter for supervision of normal pregnancy, unspecified, unspecified trimester 07/27/2021    Prenatal care       Medication review  Medication Documentation Review Audit       Reviewed by Tete Holbrook RN (Registered Nurse) on 02/08/25 at 0914      Medication Order Taking? Sig Documenting Provider Last Dose Status   cyclobenzaprine (Flexeril) 10 mg tablet [id number]  Take 1 tablet (10 mg) by mouth as needed at bedtime for muscle spasms. Cole C Budinsky, MD  Active   levonorgestrel (Mirena) 21 mcg/24 hours (8 yrs) 52 mg IUD [id number]  52 mg by intrauterine route 1 time. Historical Provider, MD  Active                    Allergies  No Known Allergies    Social History  Social History     Socioeconomic History    Marital status:      Spouse name: Not on file    Number of children: Not on file    Years of education: Not on file    Highest education level: Not on file   Occupational History    Not on file   Tobacco Use    Smoking status: Never     Passive exposure: Never    Smokeless tobacco: Never   Vaping Use    Vaping status: Never Used   Substance and Sexual Activity    Alcohol use: Never    Drug use: Never    Sexual activity: Not on file   Other Topics Concern    Not on file   Social History Narrative    Not on file     Social Drivers of Health     Financial Resource Strain: Not on file   Food Insecurity: Not on file    Transportation Needs: Not on file   Physical Activity: Not on file   Stress: Not on file   Social Connections: Not on file   Intimate Partner Violence: Not on file   Housing Stability: Not on file       Surgical History  Past Surgical History:   Procedure Laterality Date    COLONOSCOPY      OVARY SURGERY      TONSILLECTOMY         Physical Exam:  GENERAL:  Patient is awake, alert, and oriented to person place and time.  Patient appears well nourished and well kept.  Affect Calm, Not Acutely Distressed.  HEENT:  Normocephalic, Atraumatic, EOMI  CARDIOVASCULAR:  Hemodynamically stable.  RESPIRATORY:  Normal respirations with unlabored breathing.  Extremity: Left knee examination shows skin is intact.  There is no erythema or warmth.  Trace to 1+ effusion.  Can flex the right knee to 130 degrees with pain.  Full extension at 0 degrees.  Pain over the medial joint line.  No pain over the lateral joint line.  There is no pain over the patellar or quadricep tendon.  No pain over the proximal tibia.  No pain over the popliteal fossa.  Negative valgus stress test.  Negative varus stress test.  Positive Angelina's test medially with instability.  Positive Angelina's test laterally with no instability.  Negative Lachman's test.  Patellar and quadricep mechanism intact.  Negative anterior and posterior drawer test.  Negative patellar apprehension test.  Distal pulses are palpable, neurovascularly intact.  Walking with no significant antalgic gai      Diagnostics: X-rays reviewed  XR knee left 4+ views  Narrative: Interpreted By:  Morro Munoz,   STUDY:  XR KNEE LEFT 4+ VIEWS;  2/8/2025 10:09 am      INDICATION:  Signs/Symptoms:left knee pain with ROM. no trauma.          COMPARISON:  None.      ACCESSION NUMBER(S):  [id number]      ORDERING CLINICIAN:  JOE GRIMALDO      TECHNIQUE:  Left knee series performed with 4 images.      FINDINGS:  Knee alignment is normal. No acute fracture or subluxation is seen.  Joint  spaces are maintained. No joint effusion or loose body is  identified.      Impression: Unremarkable left knee series.      MACRO:  None.      Signed by: Morro Olivermaddy 2/8/2025 10:43 AM  Dictation workstation:   [id number]      Procedure: None    Assessment: Left knee internal derangement, medial meniscal tear    Plan: Crystal presents today for evaluation for acute left knee injury sustained four days after she fell out of her bed and landed on the left knee. X-rays showed no obvious fractures. We recommended MRI of the left knee for preoperative planning for left knee medial meniscal tear with instability, ice, anti-inflammatories, and physical therapy. She will be off work this week and return to work on Monday. She will follow-up with Dr Louie Castro after the MRI of the left knee.  We discussed importance of activity modification by avoiding twisting maneuvers and deep knee bends.    No orders of the defined types were placed in this encounter.     At the conclusion of the visit there were no further questions by the patient/family regarding their plan of care.  Patient was instructed to call or return with any issues, questions, or concerns regarding their injury and/or treatment plan described above.     02/12/25 at 8:10 AM - Deb Wolfe MD  Scribe Attestation  By signing my name below, I, Selwyn Mauricio, Scribtj   attest that this documentation has been prepared under the direction and in the presence of Deb Wolfe MD.    Office: [phone]    This note was prepared using voice recognition software.  The details of this note are correct and have been reviewed, and corrected to the best of my ability.  Some grammatical errors may persist related to the Dragon software.

## 2025-02-23 ENCOUNTER — HOSPITAL ENCOUNTER (OUTPATIENT)
Dept: RADIOLOGY | Facility: HOSPITAL | Age: 35
Discharge: HOME | End: 2025-02-23
Payer: COMMERCIAL

## 2025-02-23 DIAGNOSIS — M23.92 INTERNAL DERANGEMENT OF LEFT KNEE: ICD-10-CM

## 2025-02-23 DIAGNOSIS — S83.92XA SPRAIN OF LEFT KNEE, INITIAL ENCOUNTER: ICD-10-CM

## 2025-02-23 PROCEDURE — 73721 MRI JNT OF LWR EXTRE W/O DYE: CPT | Mod: LT

## 2025-02-23 PROCEDURE — 73721 MRI JNT OF LWR EXTRE W/O DYE: CPT | Mod: LEFT SIDE | Performed by: STUDENT IN AN ORGANIZED HEALTH CARE EDUCATION/TRAINING PROGRAM

## 2025-02-26 ENCOUNTER — APPOINTMENT (OUTPATIENT)
Dept: RADIOLOGY | Facility: HOSPITAL | Age: 35
End: 2025-02-26
Payer: COMMERCIAL

## 2025-02-27 ENCOUNTER — APPOINTMENT (OUTPATIENT)
Dept: PHYSICAL THERAPY | Facility: CLINIC | Age: 35
End: 2025-02-27
Payer: COMMERCIAL

## 2025-02-27 DIAGNOSIS — M25.552 LEFT HIP PAIN: ICD-10-CM

## 2025-05-17 ENCOUNTER — OFFICE VISIT (OUTPATIENT)
Dept: URGENT CARE | Age: 35
End: 2025-05-17
Payer: COMMERCIAL

## 2025-05-17 VITALS
TEMPERATURE: 98 F | DIASTOLIC BLOOD PRESSURE: 75 MMHG | BODY MASS INDEX: 31 KG/M2 | HEART RATE: 80 BPM | WEIGHT: 175 LBS | RESPIRATION RATE: 16 BRPM | OXYGEN SATURATION: 99 % | SYSTOLIC BLOOD PRESSURE: 116 MMHG

## 2025-05-17 DIAGNOSIS — J02.0 STREP PHARYNGITIS: ICD-10-CM

## 2025-05-17 LAB
POC HUMAN RHINOVIRUS PCR: NEGATIVE
POC INFLUENZA A VIRUS PCR: NEGATIVE
POC INFLUENZA B VIRUS PCR: NEGATIVE
POC RESPIRATORY SYNCYTIAL VIRUS PCR: NEGATIVE
POC STREPTOCOCCUS PYOGENES (GROUP A STREP) PCR: POSITIVE

## 2025-05-17 RX ORDER — PREDNISONE 10 MG/1
10 TABLET ORAL
Qty: 6 TABLET | Refills: 0 | Status: SHIPPED | OUTPATIENT
Start: 2025-05-17 | End: 2025-05-20

## 2025-05-17 RX ORDER — CEFDINIR 300 MG/1
300 CAPSULE ORAL 2 TIMES DAILY
Qty: 14 CAPSULE | Refills: 0 | Status: SHIPPED | OUTPATIENT
Start: 2025-05-17 | End: 2025-05-24

## 2025-05-17 RX ORDER — ONDANSETRON 4 MG/1
4 TABLET, ORALLY DISINTEGRATING ORAL 4 TIMES DAILY PRN
Qty: 15 TABLET | Refills: 0 | Status: SHIPPED | OUTPATIENT
Start: 2025-05-17

## 2025-05-17 NOTE — PROGRESS NOTES
Subjective   Patient ID: Crystal Newsome is a 35 y.o. female who presents for Sore Throat and Fever.  HPI  Presents for evaluation of throat swelling and pain.  Pain began 2-3 days pta with associated nausea and intermittent low-grade fever.  Pain is exacerbated by oral intake.  No  URI symptoms, or other constitutional S/S.    No other complaints.       Review of Systems    Constitutional:  See HPI   ENT: See HPI    Neurologic:  Alert and oriented X4, No numbness, No tingling.    All other systems are negative     Objective     /75   Pulse 80   Temp 36.7 °C (98 °F)   Resp 16   Wt 79.4 kg (175 lb)   SpO2 99%   BMI 31.00 kg/m²     Physical Exam    General:  Alert and oriented, No acute distress.    Eye:  Pupils are equal, round and reactive to light, Normal conjunctiva.    HENT:  Normocephalic, posterior oropharynx with exudate; tonsils are surgically absent; no sinus tenderness or nasal congestion; no cervical adenopathy  Neck:  Supple    Respiratory: Respirations are non-labored   Musculoskeletal: Normal ROM and strength  Integumentary:  Warm, Dry, Intact, No pallor, No rash.    Neurologic:  Alert, Oriented, Normal sensory, Cranial Nerves II-XII are grossly intact  Psychiatric:  Cooperative, Appropriate mood & affect.    Assessment/Plan   Patient tested positive for strep.  Prescriptions for Omnicef, low-dose prednisone, and Zofran.  Patient's clinical presentation is otherwise unremarkable at this time. Patient is discharged with instructions to follow-up with primary care or seek emergency medical attention for worsening symptoms or any new concerns.  Problem List Items Addressed This Visit    None  Visit Diagnoses         Strep pharyngitis        Relevant Medications    ondansetron ODT (Zofran-ODT) 4 mg disintegrating tablet    cefdinir (Omnicef) 300 mg capsule    predniSONE (Deltasone) 10 mg tablet    Other Relevant Orders    POCT SPOTFIRE R/ST Panel Mini w/Strep A ([facility]) manually resulted             Final diagnoses:   [J02.0] Strep pharyngitis

## 2025-06-26 ENCOUNTER — APPOINTMENT (OUTPATIENT)
Dept: PRIMARY CARE | Facility: CLINIC | Age: 35
End: 2025-06-26
Payer: COMMERCIAL

## 2025-07-31 ENCOUNTER — DOCUMENTATION (OUTPATIENT)
Dept: PHYSICAL THERAPY | Facility: CLINIC | Age: 35
End: 2025-07-31
Payer: COMMERCIAL

## 2025-07-31 DIAGNOSIS — M25.552 LEFT HIP PAIN: ICD-10-CM

## 2025-07-31 NOTE — PROGRESS NOTES
Physical Therapy    Discharge Summary    Name: Crystal Newsome  MRN: [id number]  : 1990  Date: 25    Discharge Summary: PT    Rehab Discharge Reason: Other Patient is being formally discharged from outpatient physical therapy services due to non-attendance, with no treatment or communication in over 30 consecutive days since their last scheduled visit. Progress toward established goals cannot be reassessed or documented due to the absence from care